# Patient Record
Sex: FEMALE | Race: WHITE | NOT HISPANIC OR LATINO | Employment: FULL TIME | URBAN - METROPOLITAN AREA
[De-identification: names, ages, dates, MRNs, and addresses within clinical notes are randomized per-mention and may not be internally consistent; named-entity substitution may affect disease eponyms.]

---

## 2018-08-20 ENCOUNTER — INITIAL PRENATAL (OUTPATIENT)
Dept: OBSTETRICS AND GYNECOLOGY | Facility: CLINIC | Age: 33
End: 2018-08-20
Payer: COMMERCIAL

## 2018-08-20 VITALS
BODY MASS INDEX: 32.54 KG/M2 | HEIGHT: 63 IN | WEIGHT: 183.63 LBS | SYSTOLIC BLOOD PRESSURE: 100 MMHG | DIASTOLIC BLOOD PRESSURE: 60 MMHG

## 2018-08-20 DIAGNOSIS — O09.512 ADVANCED MATERNAL AGE, PRIMIGRAVIDA IN SECOND TRIMESTER, ANTEPARTUM: ICD-10-CM

## 2018-08-20 PROCEDURE — 99999 PR PBB SHADOW E&M-NEW PATIENT-LVL II: CPT | Mod: PBBFAC,,, | Performed by: ADVANCED PRACTICE MIDWIFE

## 2018-08-20 PROCEDURE — 0500F INITIAL PRENATAL CARE VISIT: CPT | Mod: S$GLB,,, | Performed by: ADVANCED PRACTICE MIDWIFE

## 2018-08-20 NOTE — PROGRESS NOTES
Oriented to our practice, CNGATITO/MD collaborative care. Transfer of care, aayush NGUYEN was on board with her plan and birth desires. Long discussion about our practice philosophy. Pt just wants to let things happen naturally, if epid rec open to all suggestions. Strongly rec childbirth class. Her and spouse have not prevented for 10 yrs and got pregnant. Panorama screen done prenatally and normal.  On PNV. Blue bag info reviewed. zika and dietary recommendations made. coffective vic recommended. Coffective counseling sheet [employer] In Love discussed with mother. Reinforced immediate skin to skin, the magic first hour, importance of the first feeding and delaying routine procedures. Encouraged mother to download Coffective mobile vic if she has not already done so. Mother verbalizes understanding. al

## 2018-09-17 ENCOUNTER — ROUTINE PRENATAL (OUTPATIENT)
Dept: OBSTETRICS AND GYNECOLOGY | Facility: CLINIC | Age: 33
End: 2018-09-17
Payer: COMMERCIAL

## 2018-09-17 VITALS
SYSTOLIC BLOOD PRESSURE: 110 MMHG | WEIGHT: 191.63 LBS | DIASTOLIC BLOOD PRESSURE: 74 MMHG | BODY MASS INDEX: 33.94 KG/M2

## 2018-09-17 DIAGNOSIS — O09.512 ADVANCED MATERNAL AGE, PRIMIGRAVIDA IN SECOND TRIMESTER, ANTEPARTUM: Primary | ICD-10-CM

## 2018-09-17 PROCEDURE — 99999 PR PBB SHADOW E&M-EST. PATIENT-LVL II: CPT | Mod: PBBFAC,,, | Performed by: ADVANCED PRACTICE MIDWIFE

## 2018-09-17 PROCEDURE — 0502F SUBSEQUENT PRENATAL CARE: CPT | Mod: S$GLB,,, | Performed by: ADVANCED PRACTICE MIDWIFE

## 2018-09-17 NOTE — PROGRESS NOTES
Doing well, labs next OV> reviewed US with pt, MFM f/u normal. PTL s/s reviewed. FM counts. Has childbirth class sched for 10/13  Will tour then, deciding on peds, tdap next OV, flu given at work. al

## 2018-10-15 ENCOUNTER — LAB VISIT (OUTPATIENT)
Dept: LAB | Facility: HOSPITAL | Age: 33
End: 2018-10-15
Attending: ADVANCED PRACTICE MIDWIFE
Payer: COMMERCIAL

## 2018-10-15 ENCOUNTER — ROUTINE PRENATAL (OUTPATIENT)
Dept: OBSTETRICS AND GYNECOLOGY | Facility: CLINIC | Age: 33
End: 2018-10-15
Payer: COMMERCIAL

## 2018-10-15 VITALS — WEIGHT: 192.38 LBS | SYSTOLIC BLOOD PRESSURE: 98 MMHG | BODY MASS INDEX: 34.08 KG/M2 | DIASTOLIC BLOOD PRESSURE: 62 MMHG

## 2018-10-15 DIAGNOSIS — O09.512 ADVANCED MATERNAL AGE, PRIMIGRAVIDA IN SECOND TRIMESTER, ANTEPARTUM: ICD-10-CM

## 2018-10-15 DIAGNOSIS — O09.512 ADVANCED MATERNAL AGE, PRIMIGRAVIDA IN SECOND TRIMESTER, ANTEPARTUM: Primary | ICD-10-CM

## 2018-10-15 LAB
BASOPHILS # BLD AUTO: 0.07 K/UL
BASOPHILS NFR BLD: 0.7 %
DIFFERENTIAL METHOD: ABNORMAL
EOSINOPHIL # BLD AUTO: 0.1 K/UL
EOSINOPHIL NFR BLD: 0.9 %
ERYTHROCYTE [DISTWIDTH] IN BLOOD BY AUTOMATED COUNT: 13.4 %
GLUCOSE SERPL-MCNC: 88 MG/DL
HCT VFR BLD AUTO: 34.1 %
HGB BLD-MCNC: 10.8 G/DL
IMM GRANULOCYTES # BLD AUTO: 0.08 K/UL
IMM GRANULOCYTES NFR BLD AUTO: 0.8 %
LYMPHOCYTES # BLD AUTO: 1.4 K/UL
LYMPHOCYTES NFR BLD: 13.4 %
MCH RBC QN AUTO: 31.2 PG
MCHC RBC AUTO-ENTMCNC: 31.7 G/DL
MCV RBC AUTO: 99 FL
MONOCYTES # BLD AUTO: 0.6 K/UL
MONOCYTES NFR BLD: 5.6 %
NEUTROPHILS # BLD AUTO: 8.1 K/UL
NEUTROPHILS NFR BLD: 78.6 %
NRBC BLD-RTO: 0 /100 WBC
PLATELET # BLD AUTO: 317 K/UL
PMV BLD AUTO: 9.9 FL
RBC # BLD AUTO: 3.46 M/UL
WBC # BLD AUTO: 10.27 K/UL

## 2018-10-15 PROCEDURE — 85025 COMPLETE CBC W/AUTO DIFF WBC: CPT

## 2018-10-15 PROCEDURE — 0502F SUBSEQUENT PRENATAL CARE: CPT | Mod: S$GLB,,, | Performed by: ADVANCED PRACTICE MIDWIFE

## 2018-10-15 PROCEDURE — 82950 GLUCOSE TEST: CPT

## 2018-10-15 PROCEDURE — 36415 COLL VENOUS BLD VENIPUNCTURE: CPT | Mod: PO

## 2018-10-15 PROCEDURE — 99999 PR PBB SHADOW E&M-EST. PATIENT-LVL II: CPT | Mod: PBBFAC,,, | Performed by: ADVANCED PRACTICE MIDWIFE

## 2018-10-15 PROCEDURE — 86592 SYPHILIS TEST NON-TREP QUAL: CPT

## 2018-10-15 PROCEDURE — 86703 HIV-1/HIV-2 1 RESULT ANTBDY: CPT

## 2018-10-15 RX ORDER — VALACYCLOVIR HYDROCHLORIDE 1 G/1
TABLET, FILM COATED ORAL
Refills: 3 | COMMUNITY
Start: 2018-10-03

## 2018-10-15 NOTE — PROGRESS NOTES
Doing labs today, attended childbirth class last weekend. States  felt it was informative. Denies PTL s/s some BH. rec maternity belt, stands prolonged periods at work, wears lead apron most of the time. Will get tdap at another location later in pregnancy. al

## 2018-10-16 ENCOUNTER — TELEPHONE (OUTPATIENT)
Dept: OBSTETRICS AND GYNECOLOGY | Facility: CLINIC | Age: 33
End: 2018-10-16

## 2018-10-16 DIAGNOSIS — O99.019 ANEMIA OF MOTHER IN PREGNANCY, ANTEPARTUM: ICD-10-CM

## 2018-10-16 LAB
HIV 1+2 AB+HIV1 P24 AG SERPL QL IA: NEGATIVE
RPR SER QL: NORMAL

## 2018-10-16 RX ORDER — FERROUS SULFATE 325(65) MG
325 TABLET ORAL DAILY
Qty: 30 TABLET | Refills: 3 | Status: SHIPPED | OUTPATIENT
Start: 2018-10-16 | End: 2018-12-03

## 2018-10-16 NOTE — TELEPHONE ENCOUNTER
Please call pt and inform her that she is anemic. I sent an iron prescription to her pharmacy to begin taking. Oneyda

## 2018-10-16 NOTE — TELEPHONE ENCOUNTER
Spoke to patient. Patient notified of results and iron rx sent to pharmacy. Patient verbalized understanding.

## 2018-10-29 ENCOUNTER — ROUTINE PRENATAL (OUTPATIENT)
Dept: OBSTETRICS AND GYNECOLOGY | Facility: CLINIC | Age: 33
End: 2018-10-29
Payer: COMMERCIAL

## 2018-10-29 VITALS
SYSTOLIC BLOOD PRESSURE: 122 MMHG | WEIGHT: 197.38 LBS | DIASTOLIC BLOOD PRESSURE: 76 MMHG | BODY MASS INDEX: 34.97 KG/M2

## 2018-10-29 DIAGNOSIS — O09.512 ADVANCED MATERNAL AGE, PRIMIGRAVIDA IN SECOND TRIMESTER, ANTEPARTUM: Primary | ICD-10-CM

## 2018-10-29 DIAGNOSIS — O99.019 ANEMIA OF MOTHER IN PREGNANCY, ANTEPARTUM: ICD-10-CM

## 2018-10-29 PROCEDURE — 99999 PR PBB SHADOW E&M-EST. PATIENT-LVL II: CPT | Mod: PBBFAC,,, | Performed by: ADVANCED PRACTICE MIDWIFE

## 2018-10-29 PROCEDURE — 0502F SUBSEQUENT PRENATAL CARE: CPT | Mod: S$GLB,,, | Performed by: ADVANCED PRACTICE MIDWIFE

## 2018-10-29 RX ORDER — DOCUSATE SODIUM 100 MG/1
100 CAPSULE, LIQUID FILLED ORAL 2 TIMES DAILY
COMMUNITY
End: 2018-12-03

## 2018-10-29 NOTE — PROGRESS NOTES
"Good FM, some BH, PTL s/s reviewed. Discussed labs, on iron supplement, taking colace for constipation, will increase to BID since still feeling "full" and constipated. Discussed upcoming appt timing, will do growth scan 35-36 wks sec to [facility]    "

## 2018-11-12 ENCOUNTER — ROUTINE PRENATAL (OUTPATIENT)
Dept: OBSTETRICS AND GYNECOLOGY | Facility: CLINIC | Age: 33
End: 2018-11-12
Payer: COMMERCIAL

## 2018-11-12 VITALS
DIASTOLIC BLOOD PRESSURE: 60 MMHG | SYSTOLIC BLOOD PRESSURE: 110 MMHG | WEIGHT: 196.19 LBS | BODY MASS INDEX: 34.76 KG/M2

## 2018-11-12 DIAGNOSIS — O09.512 ADVANCED MATERNAL AGE, PRIMIGRAVIDA IN SECOND TRIMESTER, ANTEPARTUM: Primary | ICD-10-CM

## 2018-11-12 DIAGNOSIS — O99.019 ANEMIA OF MOTHER IN PREGNANCY, ANTEPARTUM: ICD-10-CM

## 2018-11-12 DIAGNOSIS — O26.843 UTERINE SIZE DATE DISCREPANCY PREGNANCY, THIRD TRIMESTER: ICD-10-CM

## 2018-11-12 PROCEDURE — 0502F SUBSEQUENT PRENATAL CARE: CPT | Mod: S$GLB,,, | Performed by: ADVANCED PRACTICE MIDWIFE

## 2018-11-12 PROCEDURE — 99999 PR PBB SHADOW E&M-EST. PATIENT-LVL II: CPT | Mod: PBBFAC,,, | Performed by: ADVANCED PRACTICE MIDWIFE

## 2018-11-13 NOTE — PROGRESS NOTES
Doing well, good FM, no PTL s/s. Syncopal episode X 1 last week while sitting at a desk at work, BP was normal, glucose was normal at 93. Will let me know if any other episodes. Traveled this past weekend went well. Exp US next OV in [place] for growth, GBS next OV. al

## 2018-12-03 ENCOUNTER — ROUTINE PRENATAL (OUTPATIENT)
Dept: OBSTETRICS AND GYNECOLOGY | Facility: CLINIC | Age: 33
End: 2018-12-03
Payer: COMMERCIAL

## 2018-12-03 ENCOUNTER — PROCEDURE VISIT (OUTPATIENT)
Dept: OBSTETRICS AND GYNECOLOGY | Facility: CLINIC | Age: 33
End: 2018-12-03
Payer: COMMERCIAL

## 2018-12-03 ENCOUNTER — LAB VISIT (OUTPATIENT)
Dept: LAB | Facility: HOSPITAL | Age: 33
End: 2018-12-03
Payer: COMMERCIAL

## 2018-12-03 VITALS
DIASTOLIC BLOOD PRESSURE: 76 MMHG | BODY MASS INDEX: 35.73 KG/M2 | WEIGHT: 201.75 LBS | SYSTOLIC BLOOD PRESSURE: 114 MMHG

## 2018-12-03 DIAGNOSIS — O99.019 ANEMIA OF MOTHER IN PREGNANCY, ANTEPARTUM: ICD-10-CM

## 2018-12-03 DIAGNOSIS — O26.843 UTERINE SIZE DATE DISCREPANCY PREGNANCY, THIRD TRIMESTER: ICD-10-CM

## 2018-12-03 DIAGNOSIS — O09.512 ADVANCED MATERNAL AGE, PRIMIGRAVIDA IN SECOND TRIMESTER, ANTEPARTUM: Primary | ICD-10-CM

## 2018-12-03 LAB
HCT VFR BLD AUTO: 34.2 %
HGB BLD-MCNC: 11.1 G/DL

## 2018-12-03 PROCEDURE — 85014 HEMATOCRIT: CPT

## 2018-12-03 PROCEDURE — 0502F SUBSEQUENT PRENATAL CARE: CPT | Mod: S$GLB,,, | Performed by: ADVANCED PRACTICE MIDWIFE

## 2018-12-03 PROCEDURE — 85018 HEMOGLOBIN: CPT

## 2018-12-03 PROCEDURE — 99999 PR PBB SHADOW E&M-EST. PATIENT-LVL II: CPT | Mod: PBBFAC,,, | Performed by: ADVANCED PRACTICE MIDWIFE

## 2018-12-03 PROCEDURE — 76816 OB US FOLLOW-UP PER FETUS: CPT | Mod: S$GLB,,, | Performed by: OBSTETRICS & GYNECOLOGY

## 2018-12-03 PROCEDURE — 90471 IMMUNIZATION ADMIN: CPT | Mod: S$GLB,,, | Performed by: OBSTETRICS & GYNECOLOGY

## 2018-12-03 PROCEDURE — 36415 COLL VENOUS BLD VENIPUNCTURE: CPT

## 2018-12-03 PROCEDURE — 90715 TDAP VACCINE 7 YRS/> IM: CPT | Mod: S$GLB,,, | Performed by: OBSTETRICS & GYNECOLOGY

## 2018-12-03 PROCEDURE — 76819 FETAL BIOPHYS PROFIL W/O NST: CPT | Mod: S$GLB,,, | Performed by: OBSTETRICS & GYNECOLOGY

## 2018-12-03 NOTE — PROGRESS NOTES
Tdap given IM to left deltoid.  Pt tolerated well.  Pt advised to wait 10-15 minutes for s/s of medication reaction, pt to wait while in lab for blood work.  Appt made for next visit at [facility], per pt request.  Pt voiced understanding.  CHRIS PHELAN

## 2018-12-04 NOTE — PROGRESS NOTES
US today EFW 31% JEN MVP 5.4 cm/vtx, BPP 8/8. Doing well, no c/o PTL s/s. Good FM. tdap today. Not taking iron was causing constipation but borderline, will recheck today. GBS next OV. al

## 2018-12-10 ENCOUNTER — ROUTINE PRENATAL (OUTPATIENT)
Dept: OBSTETRICS AND GYNECOLOGY | Facility: CLINIC | Age: 33
End: 2018-12-10
Payer: COMMERCIAL

## 2018-12-10 VITALS
BODY MASS INDEX: 35.85 KG/M2 | DIASTOLIC BLOOD PRESSURE: 68 MMHG | SYSTOLIC BLOOD PRESSURE: 118 MMHG | WEIGHT: 202.38 LBS

## 2018-12-10 DIAGNOSIS — O09.512 ADVANCED MATERNAL AGE, PRIMIGRAVIDA IN SECOND TRIMESTER, ANTEPARTUM: Primary | ICD-10-CM

## 2018-12-10 PROCEDURE — 99999 PR PBB SHADOW E&M-EST. PATIENT-LVL III: CPT | Mod: PBBFAC,,, | Performed by: ADVANCED PRACTICE MIDWIFE

## 2018-12-10 PROCEDURE — 0502F SUBSEQUENT PRENATAL CARE: CPT | Mod: S$GLB,,, | Performed by: ADVANCED PRACTICE MIDWIFE

## 2018-12-10 PROCEDURE — 87081 CULTURE SCREEN ONLY: CPT

## 2018-12-12 LAB — BACTERIA SPEC AEROBE CULT: NORMAL

## 2018-12-17 ENCOUNTER — ROUTINE PRENATAL (OUTPATIENT)
Dept: OBSTETRICS AND GYNECOLOGY | Facility: CLINIC | Age: 33
End: 2018-12-17
Payer: COMMERCIAL

## 2018-12-17 VITALS
BODY MASS INDEX: 36.01 KG/M2 | WEIGHT: 203.25 LBS | SYSTOLIC BLOOD PRESSURE: 116 MMHG | DIASTOLIC BLOOD PRESSURE: 70 MMHG

## 2018-12-17 DIAGNOSIS — O99.019 ANEMIA OF MOTHER IN PREGNANCY, ANTEPARTUM: ICD-10-CM

## 2018-12-17 DIAGNOSIS — O09.512 ADVANCED MATERNAL AGE, PRIMIGRAVIDA IN SECOND TRIMESTER, ANTEPARTUM: Primary | ICD-10-CM

## 2018-12-17 PROCEDURE — 99999 PR PBB SHADOW E&M-EST. PATIENT-LVL II: CPT | Mod: PBBFAC,,, | Performed by: ADVANCED PRACTICE MIDWIFE

## 2018-12-17 PROCEDURE — 0502F SUBSEQUENT PRENATAL CARE: CPT | Mod: S$GLB,,, | Performed by: ADVANCED PRACTICE MIDWIFE

## 2018-12-24 ENCOUNTER — ROUTINE PRENATAL (OUTPATIENT)
Dept: OBSTETRICS AND GYNECOLOGY | Facility: CLINIC | Age: 33
End: 2018-12-24
Payer: COMMERCIAL

## 2018-12-24 VITALS — BODY MASS INDEX: 36.32 KG/M2 | DIASTOLIC BLOOD PRESSURE: 68 MMHG | SYSTOLIC BLOOD PRESSURE: 100 MMHG | WEIGHT: 205 LBS

## 2018-12-24 DIAGNOSIS — O09.512 ADVANCED MATERNAL AGE, PRIMIGRAVIDA IN SECOND TRIMESTER, ANTEPARTUM: Primary | ICD-10-CM

## 2018-12-24 DIAGNOSIS — O99.019 ANEMIA OF MOTHER IN PREGNANCY, ANTEPARTUM: ICD-10-CM

## 2018-12-24 PROCEDURE — 0502F SUBSEQUENT PRENATAL CARE: CPT | Mod: S$GLB,,, | Performed by: ADVANCED PRACTICE MIDWIFE

## 2018-12-24 PROCEDURE — 99999 PR PBB SHADOW E&M-EST. PATIENT-LVL II: CPT | Mod: PBBFAC,,, | Performed by: ADVANCED PRACTICE MIDWIFE

## 2018-12-24 NOTE — PROGRESS NOTES
Some UC last week after long day at work. Assisting in shorter cases for now. rec staying active! Position changes baby feels OP on pubic bone. Ready for baby. al

## 2019-01-02 ENCOUNTER — ROUTINE PRENATAL (OUTPATIENT)
Dept: OBSTETRICS AND GYNECOLOGY | Facility: CLINIC | Age: 34
End: 2019-01-02
Payer: COMMERCIAL

## 2019-01-02 VITALS — SYSTOLIC BLOOD PRESSURE: 116 MMHG | WEIGHT: 205 LBS | BODY MASS INDEX: 36.32 KG/M2 | DIASTOLIC BLOOD PRESSURE: 78 MMHG

## 2019-01-02 DIAGNOSIS — O09.512 ADVANCED MATERNAL AGE, PRIMIGRAVIDA IN SECOND TRIMESTER, ANTEPARTUM: Primary | ICD-10-CM

## 2019-01-02 DIAGNOSIS — O99.019 ANEMIA OF MOTHER IN PREGNANCY, ANTEPARTUM: ICD-10-CM

## 2019-01-02 PROCEDURE — 99999 PR PBB SHADOW E&M-EST. PATIENT-LVL II: CPT | Mod: PBBFAC,,, | Performed by: ADVANCED PRACTICE MIDWIFE

## 2019-01-02 PROCEDURE — 0502F SUBSEQUENT PRENATAL CARE: CPT | Mod: S$GLB,,, | Performed by: ADVANCED PRACTICE MIDWIFE

## 2019-01-02 PROCEDURE — 99999 PR PBB SHADOW E&M-EST. PATIENT-LVL II: ICD-10-PCS | Mod: PBBFAC,,, | Performed by: ADVANCED PRACTICE MIDWIFE

## 2019-01-02 PROCEDURE — 0502F PR SUBSEQUENT PRENATAL CARE: ICD-10-PCS | Mod: S$GLB,,, | Performed by: ADVANCED PRACTICE MIDWIFE

## 2019-01-02 NOTE — PROGRESS NOTES
No RUC, tired, ready for baby. Still working and staying active. Discussed IOL timing, risks, pt fearful of pitocin exp can be safely used. Good FM. al

## 2019-01-07 ENCOUNTER — ROUTINE PRENATAL (OUTPATIENT)
Dept: OBSTETRICS AND GYNECOLOGY | Facility: CLINIC | Age: 34
End: 2019-01-07
Payer: COMMERCIAL

## 2019-01-07 VITALS — SYSTOLIC BLOOD PRESSURE: 118 MMHG | WEIGHT: 205 LBS | DIASTOLIC BLOOD PRESSURE: 74 MMHG | BODY MASS INDEX: 36.31 KG/M2

## 2019-01-07 DIAGNOSIS — O99.019 ANEMIA OF MOTHER IN PREGNANCY, ANTEPARTUM: ICD-10-CM

## 2019-01-07 DIAGNOSIS — O48.0 POST TERM PREGNANCY OVER 40 WEEKS: ICD-10-CM

## 2019-01-07 DIAGNOSIS — O09.512 ADVANCED MATERNAL AGE, PRIMIGRAVIDA IN SECOND TRIMESTER, ANTEPARTUM: Primary | ICD-10-CM

## 2019-01-07 PROCEDURE — 0502F PR SUBSEQUENT PRENATAL CARE: ICD-10-PCS | Mod: S$GLB,,, | Performed by: ADVANCED PRACTICE MIDWIFE

## 2019-01-07 PROCEDURE — 99999 PR PBB SHADOW E&M-EST. PATIENT-LVL II: CPT | Mod: PBBFAC,,, | Performed by: ADVANCED PRACTICE MIDWIFE

## 2019-01-07 PROCEDURE — 0502F SUBSEQUENT PRENATAL CARE: CPT | Mod: S$GLB,,, | Performed by: ADVANCED PRACTICE MIDWIFE

## 2019-01-07 PROCEDURE — 99999 PR PBB SHADOW E&M-EST. PATIENT-LVL II: ICD-10-PCS | Mod: PBBFAC,,, | Performed by: ADVANCED PRACTICE MIDWIFE

## 2019-01-07 NOTE — PROGRESS NOTES
Doing well, ready for baby, denies RUC. cx soft/favorable, + spotting with exam, reassured. Will see pt in office in 3-4 days, BPP for post term. IOL scheduled for 1/14 @ 0001, exp cytotec use. al

## 2019-01-08 ENCOUNTER — TELEPHONE (OUTPATIENT)
Dept: OBSTETRICS AND GYNECOLOGY | Facility: HOSPITAL | Age: 34
End: 2019-01-08

## 2019-01-11 ENCOUNTER — HOSPITAL ENCOUNTER (INPATIENT)
Facility: HOSPITAL | Age: 34
LOS: 4 days | Discharge: HOME | End: 2019-01-15
Attending: OBSTETRICS & GYNECOLOGY | Admitting: OBSTETRICS & GYNECOLOGY
Payer: COMMERCIAL

## 2019-01-11 ENCOUNTER — ROUTINE PRENATAL (OUTPATIENT)
Dept: OBSTETRICS AND GYNECOLOGY | Facility: CLINIC | Age: 34
End: 2019-01-11
Payer: COMMERCIAL

## 2019-01-11 ENCOUNTER — PROCEDURE VISIT (OUTPATIENT)
Dept: OBSTETRICS AND GYNECOLOGY | Facility: CLINIC | Age: 34
End: 2019-01-11
Payer: COMMERCIAL

## 2019-01-11 DIAGNOSIS — O48.0 POST TERM PREGNANCY OVER 40 WEEKS: Primary | ICD-10-CM

## 2019-01-11 DIAGNOSIS — O09.512 ADVANCED MATERNAL AGE, PRIMIGRAVIDA IN SECOND TRIMESTER, ANTEPARTUM: ICD-10-CM

## 2019-01-11 DIAGNOSIS — Z98.891 S/P PRIMARY LOW TRANSVERSE C-SECTION: Primary | ICD-10-CM

## 2019-01-11 DIAGNOSIS — O28.3 ABNORMAL PREGNANCY US: ICD-10-CM

## 2019-01-11 DIAGNOSIS — O48.0 POST TERM PREGNANCY OVER 40 WEEKS: ICD-10-CM

## 2019-01-11 LAB
ABO + RH BLD: NORMAL
ANISOCYTOSIS BLD QL SMEAR: SLIGHT
BASOPHILS # BLD AUTO: 0.03 K/UL
BASOPHILS NFR BLD: 0.2 %
BLD GP AB SCN CELLS X3 SERPL QL: NORMAL
DACRYOCYTES BLD QL SMEAR: ABNORMAL
DIFFERENTIAL METHOD: ABNORMAL
EOSINOPHIL # BLD AUTO: 0.1 K/UL
EOSINOPHIL NFR BLD: 0.7 %
ERYTHROCYTE [DISTWIDTH] IN BLOOD BY AUTOMATED COUNT: 13.2 %
HCT VFR BLD AUTO: 36.3 %
HGB BLD-MCNC: 12.1 G/DL
HYPOCHROMIA BLD QL SMEAR: ABNORMAL
LYMPHOCYTES # BLD AUTO: 1.9 K/UL
LYMPHOCYTES NFR BLD: 15.5 %
MCH RBC QN AUTO: 31.1 PG
MCHC RBC AUTO-ENTMCNC: 33.3 G/DL
MCV RBC AUTO: 93 FL
MONOCYTES # BLD AUTO: 0.7 K/UL
MONOCYTES NFR BLD: 5.9 %
NEUTROPHILS # BLD AUTO: 9.4 K/UL
NEUTROPHILS NFR BLD: 77.7 %
OVALOCYTES BLD QL SMEAR: ABNORMAL
PLATELET # BLD AUTO: 292 K/UL
PLATELET BLD QL SMEAR: ABNORMAL
PMV BLD AUTO: 9.8 FL
POIKILOCYTOSIS BLD QL SMEAR: SLIGHT
RBC # BLD AUTO: 3.89 M/UL
SPHEROCYTES BLD QL SMEAR: ABNORMAL
STOMATOCYTES BLD QL SMEAR: PRESENT
WBC # BLD AUTO: 12.11 K/UL

## 2019-01-11 PROCEDURE — 25000003 PHARM REV CODE 250: Performed by: ADVANCED PRACTICE MIDWIFE

## 2019-01-11 PROCEDURE — 76819 PR US, OB, FETAL BIOPHYSICAL, W/O NST: ICD-10-PCS | Mod: S$GLB,,, | Performed by: OBSTETRICS & GYNECOLOGY

## 2019-01-11 PROCEDURE — 86870 RBC ANTIBODY IDENTIFICATION: CPT

## 2019-01-11 PROCEDURE — 86880 COOMBS TEST DIRECT: CPT

## 2019-01-11 PROCEDURE — 0502F SUBSEQUENT PRENATAL CARE: CPT | Mod: S$GLB,,, | Performed by: ADVANCED PRACTICE MIDWIFE

## 2019-01-11 PROCEDURE — 72100003 HC LABOR CARE, EA. ADDL. 8 HRS

## 2019-01-11 PROCEDURE — 76819 FETAL BIOPHYS PROFIL W/O NST: CPT | Mod: S$GLB,,, | Performed by: OBSTETRICS & GYNECOLOGY

## 2019-01-11 PROCEDURE — 0502F PR SUBSEQUENT PRENATAL CARE: ICD-10-PCS | Mod: S$GLB,,, | Performed by: ADVANCED PRACTICE MIDWIFE

## 2019-01-11 PROCEDURE — 11000001 HC ACUTE MED/SURG PRIVATE ROOM

## 2019-01-11 PROCEDURE — 86901 BLOOD TYPING SEROLOGIC RH(D): CPT

## 2019-01-11 PROCEDURE — 86077 PHYS BLOOD BANK SERV XMATCH: CPT | Mod: ,,, | Performed by: PATHOLOGY

## 2019-01-11 PROCEDURE — 72100002 HC LABOR CARE, 1ST 8 HOURS

## 2019-01-11 PROCEDURE — 86077 PR  PHYS BLD BANK SERV,DIFF XMTCH: ICD-10-PCS | Mod: ,,, | Performed by: PATHOLOGY

## 2019-01-11 PROCEDURE — 85025 COMPLETE CBC W/AUTO DIFF WBC: CPT

## 2019-01-11 RX ORDER — SODIUM CHLORIDE 9 MG/ML
INJECTION, SOLUTION INTRAVENOUS
Status: DISCONTINUED | OUTPATIENT
Start: 2019-01-11 | End: 2019-01-14

## 2019-01-11 RX ORDER — OXYTOCIN/RINGER'S LACTATE 20/1000 ML
333 PLASTIC BAG, INJECTION (ML) INTRAVENOUS CONTINUOUS
Status: ACTIVE | OUTPATIENT
Start: 2019-01-11 | End: 2019-01-11

## 2019-01-11 RX ORDER — SODIUM CHLORIDE, SODIUM LACTATE, POTASSIUM CHLORIDE, CALCIUM CHLORIDE 600; 310; 30; 20 MG/100ML; MG/100ML; MG/100ML; MG/100ML
INJECTION, SOLUTION INTRAVENOUS CONTINUOUS
Status: DISCONTINUED | OUTPATIENT
Start: 2019-01-11 | End: 2019-01-14

## 2019-01-11 RX ORDER — OXYTOCIN/RINGER'S LACTATE 20/1000 ML
41.7 PLASTIC BAG, INJECTION (ML) INTRAVENOUS CONTINUOUS
Status: ACTIVE | OUTPATIENT
Start: 2019-01-11 | End: 2019-01-11

## 2019-01-11 RX ORDER — ONDANSETRON 8 MG/1
8 TABLET, ORALLY DISINTEGRATING ORAL EVERY 8 HOURS PRN
Status: DISCONTINUED | OUTPATIENT
Start: 2019-01-11 | End: 2019-01-14

## 2019-01-11 RX ADMIN — DINOPROSTONE 10 MG: 10 INSERT VAGINAL at 11:01

## 2019-01-11 RX ADMIN — DINOPROSTONE 10 MG: 10 INSERT VAGINAL at 07:01

## 2019-01-11 NOTE — NURSING
"Discussed feeding choice with mother.  Reviewed benefits of breastfeeding and risks of formula feeding. Patient given "What to Expect in the First 48 Hours" handout. Mother states her intention is to breast feed infant.   "

## 2019-01-12 LAB
BLOOD GROUP ANTIBODIES SERPL: NORMAL
DAT IGG-SP REAG RBC-IMP: NORMAL

## 2019-01-12 PROCEDURE — 86901 BLOOD TYPING SEROLOGIC RH(D): CPT

## 2019-01-12 PROCEDURE — 25000003 PHARM REV CODE 250: Performed by: ADVANCED PRACTICE MIDWIFE

## 2019-01-12 PROCEDURE — 72100003 HC LABOR CARE, EA. ADDL. 8 HRS

## 2019-01-12 PROCEDURE — 86850 RBC ANTIBODY SCREEN: CPT

## 2019-01-12 PROCEDURE — 86900 BLOOD TYPING SEROLOGIC ABO: CPT

## 2019-01-12 PROCEDURE — 11000001 HC ACUTE MED/SURG PRIVATE ROOM

## 2019-01-12 RX ORDER — ACETAMINOPHEN 325 MG/1
650 TABLET ORAL EVERY 6 HOURS PRN
Status: DISCONTINUED | OUTPATIENT
Start: 2019-01-12 | End: 2019-01-14

## 2019-01-12 RX ORDER — HYDROXYZINE PAMOATE 50 MG/1
50 CAPSULE ORAL EVERY 8 HOURS PRN
Status: DISCONTINUED | OUTPATIENT
Start: 2019-01-12 | End: 2019-01-14

## 2019-01-12 RX ORDER — MISOPROSTOL 100 MCG
50 TABLET ORAL EVERY 6 HOURS
Status: DISCONTINUED | OUTPATIENT
Start: 2019-01-12 | End: 2019-01-14

## 2019-01-12 RX ORDER — MISOPROSTOL 100 MCG
25 TABLET ORAL
Status: DISCONTINUED | OUTPATIENT
Start: 2019-01-12 | End: 2019-01-12

## 2019-01-12 RX ADMIN — ACETAMINOPHEN 650 MG: 325 TABLET ORAL at 02:01

## 2019-01-12 RX ADMIN — Medication 50 MCG: at 05:01

## 2019-01-12 RX ADMIN — Medication 25 MCG: at 01:01

## 2019-01-12 RX ADMIN — Medication 25 MCG: at 08:01

## 2019-01-12 NOTE — PROGRESS NOTES
S: Doing well, POC discussed with pt and she agrees   O:  VS reviewed, afebrile   Vitals:    01/11/19 1929 01/11/19 2230 01/12/19 0145 01/12/19 0818   BP: 126/78 124/86 (!) 98/59 116/73   Pulse: (!) 111 79 79 110   Resp: 18 16 16    Temp: 97.9 °F (36.6 °C) 97.8 °F (36.6 °C) 98.1 °F (36.7 °C) 98.6 °F (37 °C)   TempSrc: Oral Oral Oral Oral   Weight:       Height:           FHTs: 130, cat 1   UC:ireg   SVE: 1/50/-3  Cervidil dc per RN at 0600  A: IUP @ 40w6d ;     Patient Active Problem List   Diagnosis    Advanced maternal age, primigravida in second trimester, antepartum    Anemia of mother in pregnancy, antepartum    Abnormal pregnancy US       P:Cytotec 25 mcg po q 6 hrs    Continue close monitoring of maternal/fetal status

## 2019-01-12 NOTE — SUBJECTIVE & OBJECTIVE
Obstetric HPI:  Patient reports no contractions, active fetal movement, No vaginal bleeding , No loss of fluid     This pregnancy has been complicated by AMA, pregnancy uneventful, BPP today 4 of 8    Obstetric History       T0      L0     SAB0   TAB0   Ectopic0   Multiple0   Live Births0       # Outcome Date GA Lbr Rohit/2nd Weight Sex Delivery Anes PTL Lv   1 Current               Obstetric Comments   G//Panorama WNL     History reviewed. No pertinent past medical history.  Past Surgical History:   Procedure Laterality Date    GANGLION CYST EXCISION         PTA Medications   Medication Sig    acetaminophen (TYLENOL) 500 MG tablet Take 500 mg by mouth every 6 (six) hours as needed for Pain.    doxylamine succinate (UNISOM, DOXYLAMINE,) 25 mg tablet Take 25 mg by mouth nightly as needed for Insomnia.    evening primrose oil (EVENING PRIMROSE ORAL) Take by mouth.    prenatal vit/iron fum/folic ac (PRENATAL 1+1 ORAL) Take by mouth.    valACYclovir (VALTREX) 1000 MG tablet TK 2 TS PO BID       Review of patient's allergies indicates:  No Known Allergies     Family History     None        Tobacco Use    Smoking status: Never Smoker    Smokeless tobacco: Never Used   Substance and Sexual Activity    Alcohol use: No     Frequency: Never    Drug use: No    Sexual activity: Yes     Partners: Male     Review of Systems   Constitutional: Negative.    HENT: Negative.    Eyes: Negative.    Respiratory: Negative.    Cardiovascular: Negative.    Gastrointestinal: Negative.    Endocrine: Negative.    Genitourinary: Negative.    Musculoskeletal: Negative.    Neurological: Negative.    Hematological: Negative.    Psychiatric/Behavioral: Negative.    All other systems reviewed and are negative.  Breast: negative.       Objective:     Vital Signs (Most Recent):  Temp: 98.3 °F (36.8 °C) (19 1720)  Pulse: (!) 118 (19 1720)  Resp: 18 (19 1430)  BP: 120/77 (19 1720) Vital Signs (24h  Range):  Temp:  [98.2 °F (36.8 °C)-98.3 °F (36.8 °C)] 98.3 °F (36.8 °C)  Pulse:  [] 118  Resp:  [18] 18  BP: (120)/(77-83) 120/77     Weight: 93 kg (205 lb)  Body mass index is 36.31 kg/m².    FHT: Cat 1 (reassuring)  TOCO: Irregular     Physical Exam:   Constitutional: She is oriented to person, place, and time. She appears well-developed and well-nourished.    HENT:   Head: Normocephalic.    Eyes: Pupils are equal, round, and reactive to light.    Neck: Normal range of motion.    Cardiovascular: Normal rate and regular rhythm.     Pulmonary/Chest: Effort normal and breath sounds normal.        Abdominal: Soft.     Genitourinary: Uterus normal.           Musculoskeletal: Normal range of motion and moves all extremeties.       Neurological: She is alert and oriented to person, place, and time. She has normal reflexes.    Skin: Skin is warm and dry.    Psychiatric: She has a normal mood and affect. Her behavior is normal.   CERVix 1/50/-2         Significant Labs:  Lab Results   Component Value Date    GROUPTRH O POS 06/20/2018    STREPBCULT No Group B Streptococcus isolated 12/10/2018       I have personallly reviewed all pertinent lab results from the last 24 hours.

## 2019-01-12 NOTE — HOSPITAL COURSE
Admit Desires to eat will initiate cervidil @ 1900, POC discussed with patient and SO, verbalizes understanding   Sheldon bulb inserted  AROM 1532  IUPC placed  Pushed x 2 hours with poor progress  Will proceed to LTCS  01/15/2019--stable for discharge on pod#2

## 2019-01-12 NOTE — PROGRESS NOTES
"S:states cervidil fell out in toilet  O:  VS reviewed, afebrile   Vitals:    01/11/19 1430 01/11/19 1720 01/11/19 1929   BP: 120/83 120/77 126/78   Pulse: 81 (!) 118 (!) 111   Resp: 18  18   Temp: 98.2 °F (36.8 °C) 98.3 °F (36.8 °C) 97.9 °F (36.6 °C)   TempSrc: Oral Oral Oral   Weight: 93 kg (205 lb)     Height: 5' 3" (1.6 m)         FHTs:  CAT 1-2, reassuring  UC: Irregular   SVE: FT/80    A: IUP @ 40w5d ;     Patient Active Problem List   Diagnosis    Advanced maternal age, primigravida in second trimester, antepartum    Anemia of mother in pregnancy, antepartum    Abnormal pregnancy US       P: Reinsert Cervidil  Continue close monitoring of maternal/fetal status  "

## 2019-01-12 NOTE — PLAN OF CARE
Problem: Adult Inpatient Plan of Care  Goal: Patient-Specific Goal (Individualization)  Outcome: Ongoing (interventions implemented as appropriate)  Pt progressing through IOL with PO cytotec.  SVE 1/50-3 posterior.  Family at  for support.  VS WNL, membranes intact, contractions mild.

## 2019-01-12 NOTE — PROGRESS NOTES
S:Doing well, questioning c/s and length of iol, questions answered and she agrees to POC, cs discouraged, risks reviewed   O:  VS reviewed, afebrile   Vitals:    01/12/19 0145 01/12/19 0818 01/12/19 1224 01/12/19 1720   BP: (!) 98/59 116/73 111/74 116/69   Pulse: 79 110 94 76   Resp: 16      Temp: 98.1 °F (36.7 °C) 98.6 °F (37 °C) 98.2 °F (36.8 °C) 98.5 °F (36.9 °C)   TempSrc: Oral Oral Oral Oral   Weight:       Height:           FHTs: 130, cat 1   UC q 1-5 ireg  SVE 1/50/-3    A: IUP @ 40w6d ;     Patient Active Problem List   Diagnosis    Advanced maternal age, primigravida in second trimester, antepartum    Anemia of mother in pregnancy, antepartum    Abnormal pregnancy US       P:Cytotec 50 mcg po q 6   Continue close monitoring of maternal/fetal status  Anticipate vag del

## 2019-01-12 NOTE — PROGRESS NOTES
Pt states that she has a hard time sleeping and requests minimal disruptions throughout the night and vitals only when awake. Pt stable at this time, educated on risks and benefits. Verbalized understanding.

## 2019-01-13 ENCOUNTER — ANESTHESIA (OUTPATIENT)
Dept: OBSTETRICS AND GYNECOLOGY | Facility: HOSPITAL | Age: 34
End: 2019-01-13
Payer: COMMERCIAL

## 2019-01-13 ENCOUNTER — ANESTHESIA EVENT (OUTPATIENT)
Dept: OBSTETRICS AND GYNECOLOGY | Facility: HOSPITAL | Age: 34
End: 2019-01-13
Payer: COMMERCIAL

## 2019-01-13 VITALS
HEART RATE: 99 BPM | TEMPERATURE: 99 F | SYSTOLIC BLOOD PRESSURE: 111 MMHG | OXYGEN SATURATION: 98 % | DIASTOLIC BLOOD PRESSURE: 58 MMHG

## 2019-01-13 PROBLEM — Z98.891 S/P PRIMARY LOW TRANSVERSE C-SECTION: Status: ACTIVE | Noted: 2019-01-13

## 2019-01-13 PROCEDURE — 25000003 PHARM REV CODE 250: Performed by: ADVANCED PRACTICE MIDWIFE

## 2019-01-13 PROCEDURE — 62326 NJX INTERLAMINAR LMBR/SAC: CPT | Performed by: ANESTHESIOLOGY

## 2019-01-13 PROCEDURE — 51701 INSERT BLADDER CATHETER: CPT

## 2019-01-13 PROCEDURE — 63600175 PHARM REV CODE 636 W HCPCS: Performed by: OBSTETRICS & GYNECOLOGY

## 2019-01-13 PROCEDURE — 51702 INSERT TEMP BLADDER CATH: CPT

## 2019-01-13 PROCEDURE — 37000009 HC ANESTHESIA EA ADD 15 MINS: Performed by: OBSTETRICS & GYNECOLOGY

## 2019-01-13 PROCEDURE — 63600175 PHARM REV CODE 636 W HCPCS: Performed by: ADVANCED PRACTICE MIDWIFE

## 2019-01-13 PROCEDURE — 59514 CESAREAN DELIVERY ONLY: CPT | Mod: AS,GB,, | Performed by: PHYSICIAN ASSISTANT

## 2019-01-13 PROCEDURE — 63600175 PHARM REV CODE 636 W HCPCS: Performed by: NURSE ANESTHETIST, CERTIFIED REGISTERED

## 2019-01-13 PROCEDURE — 25000003 PHARM REV CODE 250: Performed by: ANESTHESIOLOGY

## 2019-01-13 PROCEDURE — 59510 PR FULL ROUT OBSTE CARE,CESAREAN DELIV: ICD-10-PCS | Mod: GB,,, | Performed by: OBSTETRICS & GYNECOLOGY

## 2019-01-13 PROCEDURE — 25000003 PHARM REV CODE 250: Performed by: NURSE ANESTHETIST, CERTIFIED REGISTERED

## 2019-01-13 PROCEDURE — 27800517 HC TRAY,EPIDURAL-CONTINUOUS: Performed by: NURSE ANESTHETIST, CERTIFIED REGISTERED

## 2019-01-13 PROCEDURE — 25000003 PHARM REV CODE 250: Performed by: OBSTETRICS & GYNECOLOGY

## 2019-01-13 PROCEDURE — 59510 CESAREAN DELIVERY: CPT | Mod: GB,,, | Performed by: OBSTETRICS & GYNECOLOGY

## 2019-01-13 PROCEDURE — 59514 PR CESAREAN DELIVERY ONLY: ICD-10-PCS | Mod: AS,GB,, | Performed by: PHYSICIAN ASSISTANT

## 2019-01-13 PROCEDURE — 36000684 HC CESAREAN SECTION, UNSCHEDULED

## 2019-01-13 PROCEDURE — 11000001 HC ACUTE MED/SURG PRIVATE ROOM

## 2019-01-13 PROCEDURE — 72100003 HC LABOR CARE, EA. ADDL. 8 HRS

## 2019-01-13 PROCEDURE — 37000008 HC ANESTHESIA 1ST 15 MINUTES: Performed by: OBSTETRICS & GYNECOLOGY

## 2019-01-13 RX ORDER — SODIUM CITRATE AND CITRIC ACID MONOHYDRATE 334; 500 MG/5ML; MG/5ML
30 SOLUTION ORAL
Status: DISCONTINUED | OUTPATIENT
Start: 2019-01-13 | End: 2019-01-14

## 2019-01-13 RX ORDER — KETOROLAC TROMETHAMINE 30 MG/ML
INJECTION, SOLUTION INTRAMUSCULAR; INTRAVENOUS
Status: DISCONTINUED | OUTPATIENT
Start: 2019-01-13 | End: 2019-01-13

## 2019-01-13 RX ORDER — MISOPROSTOL 200 UG/1
800 TABLET ORAL
Status: DISCONTINUED | OUTPATIENT
Start: 2019-01-13 | End: 2019-01-14

## 2019-01-13 RX ORDER — OXYTOCIN/RINGER'S LACTATE 20/1000 ML
333 PLASTIC BAG, INJECTION (ML) INTRAVENOUS CONTINUOUS
Status: ACTIVE | OUTPATIENT
Start: 2019-01-13 | End: 2019-01-13

## 2019-01-13 RX ORDER — LIDOCAINE HCL/EPINEPHRINE/PF 2%-1:200K
VIAL (ML) INJECTION
Status: DISCONTINUED | OUTPATIENT
Start: 2019-01-13 | End: 2019-01-13

## 2019-01-13 RX ORDER — BUTORPHANOL TARTRATE 2 MG/ML
1 INJECTION INTRAMUSCULAR; INTRAVENOUS ONCE
Status: COMPLETED | OUTPATIENT
Start: 2019-01-13 | End: 2019-01-13

## 2019-01-13 RX ORDER — OXYTOCIN/RINGER'S LACTATE 20/1000 ML
41.7 PLASTIC BAG, INJECTION (ML) INTRAVENOUS CONTINUOUS
Status: DISCONTINUED | OUTPATIENT
Start: 2019-01-13 | End: 2019-01-14

## 2019-01-13 RX ORDER — ROPIVACAINE HYDROCHLORIDE 2 MG/ML
INJECTION, SOLUTION EPIDURAL; INFILTRATION; PERINEURAL CONTINUOUS PRN
Status: DISCONTINUED | OUTPATIENT
Start: 2019-01-13 | End: 2019-01-13

## 2019-01-13 RX ORDER — SODIUM CITRATE AND CITRIC ACID MONOHYDRATE 334; 500 MG/5ML; MG/5ML
30 SOLUTION ORAL
Status: DISCONTINUED | OUTPATIENT
Start: 2019-01-13 | End: 2019-01-13 | Stop reason: SDUPTHER

## 2019-01-13 RX ORDER — LIDOCAINE HYDROCHLORIDE AND EPINEPHRINE 15; 5 MG/ML; UG/ML
INJECTION, SOLUTION EPIDURAL
Status: COMPLETED | OUTPATIENT
Start: 2019-01-13 | End: 2019-01-13

## 2019-01-13 RX ORDER — ONDANSETRON 2 MG/ML
INJECTION INTRAMUSCULAR; INTRAVENOUS
Status: DISCONTINUED | OUTPATIENT
Start: 2019-01-13 | End: 2019-01-13

## 2019-01-13 RX ORDER — OXYTOCIN/RINGER'S LACTATE 20/1000 ML
2 PLASTIC BAG, INJECTION (ML) INTRAVENOUS CONTINUOUS
Status: DISCONTINUED | OUTPATIENT
Start: 2019-01-13 | End: 2019-01-14

## 2019-01-13 RX ORDER — MISOPROSTOL 200 UG/1
800 TABLET ORAL
Status: DISCONTINUED | OUTPATIENT
Start: 2019-01-13 | End: 2019-01-13 | Stop reason: SDUPTHER

## 2019-01-13 RX ORDER — SODIUM CHLORIDE, SODIUM LACTATE, POTASSIUM CHLORIDE, CALCIUM CHLORIDE 600; 310; 30; 20 MG/100ML; MG/100ML; MG/100ML; MG/100ML
INJECTION, SOLUTION INTRAVENOUS CONTINUOUS
Status: DISCONTINUED | OUTPATIENT
Start: 2019-01-13 | End: 2019-01-14

## 2019-01-13 RX ORDER — OXYTOCIN/RINGER'S LACTATE 20/1000 ML
333 PLASTIC BAG, INJECTION (ML) INTRAVENOUS CONTINUOUS
Status: DISCONTINUED | OUTPATIENT
Start: 2019-01-13 | End: 2019-01-13 | Stop reason: SDUPTHER

## 2019-01-13 RX ORDER — MORPHINE SULFATE 1 MG/ML
INJECTION, SOLUTION EPIDURAL; INTRATHECAL; INTRAVENOUS
Status: DISCONTINUED | OUTPATIENT
Start: 2019-01-13 | End: 2019-01-13

## 2019-01-13 RX ORDER — SODIUM CHLORIDE, SODIUM LACTATE, POTASSIUM CHLORIDE, CALCIUM CHLORIDE 600; 310; 30; 20 MG/100ML; MG/100ML; MG/100ML; MG/100ML
INJECTION, SOLUTION INTRAVENOUS CONTINUOUS
Status: DISCONTINUED | OUTPATIENT
Start: 2019-01-13 | End: 2019-01-13 | Stop reason: SDUPTHER

## 2019-01-13 RX ORDER — ROPIVACAINE HYDROCHLORIDE 2 MG/ML
INJECTION, SOLUTION EPIDURAL; INFILTRATION; PERINEURAL
Status: DISCONTINUED | OUTPATIENT
Start: 2019-01-13 | End: 2019-01-13

## 2019-01-13 RX ADMIN — ROPIVACAINE HYDROCHLORIDE 5 ML: 2 INJECTION, SOLUTION EPIDURAL; INFILTRATION at 06:01

## 2019-01-13 RX ADMIN — SODIUM CHLORIDE, SODIUM LACTATE, POTASSIUM CHLORIDE, AND CALCIUM CHLORIDE: 600; 310; 30; 20 INJECTION, SOLUTION INTRAVENOUS at 07:01

## 2019-01-13 RX ADMIN — Medication 2 MILLI-UNITS/MIN: at 07:01

## 2019-01-13 RX ADMIN — KETOROLAC TROMETHAMINE 30 MG: 30 INJECTION, SOLUTION INTRAMUSCULAR at 10:01

## 2019-01-13 RX ADMIN — LIDOCAINE HYDROCHLORIDE,EPINEPHRINE BITARTRATE 5 ML: 20; .005 INJECTION, SOLUTION EPIDURAL; INFILTRATION; INTRACAUDAL; PERINEURAL at 09:01

## 2019-01-13 RX ADMIN — Medication 50 MCG: at 01:01

## 2019-01-13 RX ADMIN — MEPERIDINE HYDROCHLORIDE 12.5 MG: 50 INJECTION, SOLUTION INTRAMUSCULAR; INTRAVENOUS; SUBCUTANEOUS at 10:01

## 2019-01-13 RX ADMIN — Medication 20 UNITS: at 10:01

## 2019-01-13 RX ADMIN — BUTORPHANOL TARTRATE 1 MG: 2 INJECTION, SOLUTION INTRAMUSCULAR; INTRAVENOUS at 07:01

## 2019-01-13 RX ADMIN — SODIUM CHLORIDE, SODIUM LACTATE, POTASSIUM CHLORIDE, AND CALCIUM CHLORIDE: 600; 310; 30; 20 INJECTION, SOLUTION INTRAVENOUS at 12:01

## 2019-01-13 RX ADMIN — ROPIVACAINE HYDROCHLORIDE 12 ML/HR: 2 INJECTION, SOLUTION EPIDURAL; INFILTRATION at 12:01

## 2019-01-13 RX ADMIN — LIDOCAINE HYDROCHLORIDE,EPINEPHRINE BITARTRATE 4 ML: 20; .005 INJECTION, SOLUTION EPIDURAL; INFILTRATION; INTRACAUDAL; PERINEURAL at 08:01

## 2019-01-13 RX ADMIN — ONDANSETRON 4 MG: 2 INJECTION, SOLUTION INTRAMUSCULAR; INTRAVENOUS at 10:01

## 2019-01-13 RX ADMIN — ROPIVACAINE HYDROCHLORIDE 14 ML/HR: 2 INJECTION, SOLUTION EPIDURAL; INFILTRATION at 06:01

## 2019-01-13 RX ADMIN — MORPHINE SULFATE 3 MG: 1 INJECTION, SOLUTION EPIDURAL; INTRATHECAL; INTRAVENOUS at 10:01

## 2019-01-13 RX ADMIN — ROPIVACAINE HYDROCHLORIDE 12 ML: 2 INJECTION, SOLUTION EPIDURAL; INFILTRATION at 12:01

## 2019-01-13 RX ADMIN — SODIUM CHLORIDE, SODIUM LACTATE, POTASSIUM CHLORIDE, AND CALCIUM CHLORIDE 1000 ML: .6; .31; .03; .02 INJECTION, SOLUTION INTRAVENOUS at 11:01

## 2019-01-13 RX ADMIN — CEFAZOLIN 2 G: 330 INJECTION, POWDER, FOR SOLUTION INTRAMUSCULAR; INTRAVENOUS at 09:01

## 2019-01-13 RX ADMIN — SODIUM CHLORIDE, SODIUM LACTATE, POTASSIUM CHLORIDE, AND CALCIUM CHLORIDE: 600; 310; 30; 20 INJECTION, SOLUTION INTRAVENOUS at 06:01

## 2019-01-13 RX ADMIN — SODIUM CHLORIDE, SODIUM LACTATE, POTASSIUM CHLORIDE, AND CALCIUM CHLORIDE: 600; 310; 30; 20 INJECTION, SOLUTION INTRAVENOUS at 09:01

## 2019-01-13 RX ADMIN — AZITHROMYCIN MONOHYDRATE 500 MG: 500 INJECTION, POWDER, LYOPHILIZED, FOR SOLUTION INTRAVENOUS at 09:01

## 2019-01-13 RX ADMIN — HYDROXYZINE PAMOATE 50 MG: 50 CAPSULE ORAL at 01:01

## 2019-01-13 RX ADMIN — LIDOCAINE HYDROCHLORIDE,EPINEPHRINE BITARTRATE 3 ML: 15; .005 INJECTION, SOLUTION EPIDURAL; INFILTRATION; INTRACAUDAL; PERINEURAL at 12:01

## 2019-01-13 NOTE — SUBJECTIVE & OBJECTIVE
Interval History:  Oneyda is a 33 y.o.  at 41w0d. She is doing well.     Objective:     Vital Signs (Most Recent):  Temp: 99.5 °F (37.5 °C) (19 1517)  Pulse: 77 (19 1532)  Resp: 18 (19 1909)  BP: 120/68 (19 1532) Vital Signs (24h Range):  Temp:  [98.3 °F (36.8 °C)-99.5 °F (37.5 °C)] 99.5 °F (37.5 °C)  Pulse:  [69-94] 77  Resp:  [18] 18  BP: ()/(50-76) 120/68     Weight: 93 kg (205 lb)  Body mass index is 36.31 kg/m².    FHT: 145Cat 1 (reassuring)  TOCO:  Q 2-3 minutes    Cervical Exam:  Dilation:  6  Effacement:  75%  Station: -3  Presentation: Vertex     Significant Labs:  Lab Results   Component Value Date    GROUPTRH O POS 2019    STREPBCULT No Group B Streptococcus isolated 12/10/2018       I have personallly reviewed all pertinent lab results from the last 24 hours.    Physical Exam

## 2019-01-13 NOTE — PROGRESS NOTES
S: Pt questioning poc and iol process and timing of delivery, questions answered and pt reassured   O:  VS reviewed, afebrile   Vitals:    19 0818 19 1224 19 1720 19 1909   BP: 116/73 111/74 116/69 118/75   Pulse: 110 94 76 80   Resp:    18   Temp: 98.6 °F (37 °C) 98.2 °F (36.8 °C) 98.5 °F (36.9 °C) 98.6 °F (37 °C)   TempSrc: Oral Oral Oral Oral   Weight:       Height:           FHTs: 145  UC:6-7, ireg pt denies ctx or pain   SVE: /-3 soft bloody show noted     A: IUP @ 40w6d ;     Patient Active Problem List   Diagnosis    Advanced maternal age, primigravida in second trimester, antepartum    Anemia of mother in pregnancy, antepartum    Abnormal pregnancy US       P: Pt requesting to eat and take a 2 hr break, then resume cytotec po 50 mcg q 6   Anticipate    Continue close monitoring of maternal/fetal status

## 2019-01-13 NOTE — ANESTHESIA PROCEDURE NOTES
Epidural    Patient location during procedure: OB   Reason for block: primary anesthetic   Diagnosis: iup   Start time: 1/13/2019 12:10 PM  Timeout: 1/13/2019 12:08 PM  End time: 1/13/2019 12:13 PM  Surgery related to: labor  Staffing  Anesthesiologist: Lizandro Hammond MD  Performed: anesthesiologist   Preanesthetic Checklist  Completed: patient identified, surgical consent, pre-op evaluation, timeout performed, IV checked, risks and benefits discussed, monitors and equipment checked, anesthesia consent given, hand hygiene performed and patient being monitored  Preparation  Patient position: sitting  Prep: Betadine  Patient monitoring: Pulse Ox and Blood Pressure  Epidural  Skin Anesthetic: lidocaine 1%  Skin Wheal: 3 mL  Administration type: continuous  Approach: midline  Interspace: L3-4  Injection technique: CARTER air  Needle and Epidural Catheter  Needle type: Tuohy   Needle gauge: 18  Needle length: 3.5 inches  Needle insertion depth: 6 cm  Catheter type: multi-orifice  Catheter size: 20 G  Catheter at skin depth: 14 cm  Test dose: 3 mL of lidocaine 1.5% with Epi 1-to-200,000  Additional Documentation: incremental injection, negative aspiration for heme and CSF, no paresthesia on injection, no significant pain on injection, no significant complaints from patient and no signs/symptoms of IV or SA injection  Needle localization: anatomical landmarks  Assessment  Upper dermatomal levels - Left: T10  Right: T10   Dermatomal levels determined by alcohol wipe  Ease of block: easy  Patient's tolerance of the procedure: comfortable throughout block and no complaints  Post dural Puncture Headache?: No

## 2019-01-13 NOTE — PROGRESS NOTES
Pt here for US secondary to post term, BPP 4/8 no tone or breathing visualized. Discussed findings with pt, sent to LND for IOL. al

## 2019-01-13 NOTE — PROGRESS NOTES
[facility] [place]  Obstetrics  Labor Progress Note    Patient Name: Oneyda Obando  MRN: [id number]  Admission Date: 2019  Hospital Length of Stay: 2 days  Attending Physician: Kiesha Ash, *  Primary Care Provider: Primary Doctor No    Subjective:     Principal Problem:<principal problem not specified>    Hospital Course:  Admit Desires to eat will initiate cervidil @ 1900, POC discussed with patient and SO, verbalizes understanding   Sheldon bulb inserted    Obstetric HPI:  Patient reports no contractions, active fetal movement, No vaginal bleeding , No loss of fluid     This pregnancy has been complicated by AMA, pregnancy uneventful, BPP today 4 of 8    Obstetric History       T0      L0     SAB0   TAB0   Ectopic0   Multiple0   Live Births0       # Outcome Date GA Lbr Rohit/2nd Weight Sex Delivery Anes PTL Lv   1 Current               Obstetric Comments   G//Panorama WNL     History reviewed. No pertinent past medical history.  Past Surgical History:   Procedure Laterality Date    GANGLION CYST EXCISION         PTA Medications   Medication Sig    acetaminophen (TYLENOL) 500 MG tablet Take 500 mg by mouth every 6 (six) hours as needed for Pain.    doxylamine succinate (UNISOM, DOXYLAMINE,) 25 mg tablet Take 25 mg by mouth nightly as needed for Insomnia.    evening primrose oil (EVENING PRIMROSE ORAL) Take by mouth.    prenatal vit/iron fum/folic ac (PRENATAL 1+1 ORAL) Take by mouth.    valACYclovir (VALTREX) 1000 MG tablet TK 2 TS PO BID       Review of patient's allergies indicates:  No Known Allergies     Family History     None        Tobacco Use    Smoking status: Never Smoker    Smokeless tobacco: Never Used   Substance and Sexual Activity    Alcohol use: No     Frequency: Never    Drug use: No    Sexual activity: Yes     Partners: Male     Review of Systems   Constitutional: Negative.    HENT: Negative.    Eyes: Negative.    Respiratory: Negative.     Cardiovascular: Negative.    Gastrointestinal: Negative.    Endocrine: Negative.    Genitourinary: Negative.    Musculoskeletal: Negative.    Neurological: Negative.    Hematological: Negative.    Psychiatric/Behavioral: Negative.    All other systems reviewed and are negative.  Breast: negative.       Objective:     Vital Signs (Most Recent):  Temp: 98.3 °F (36.8 °C) (19 1720)  Pulse: (!) 118 (19 1720)  Resp: 18 (19 1430)  BP: 120/77 (19 1720) Vital Signs (24h Range):  Temp:  [98.2 °F (36.8 °C)-98.3 °F (36.8 °C)] 98.3 °F (36.8 °C)  Pulse:  [] 118  Resp:  [18] 18  BP: (120)/(77-83) 120/77     Weight: 93 kg (205 lb)  Body mass index is 36.31 kg/m².    FHT: Cat 1 (reassuring)  TOCO: Irregular     Physical Exam:   Constitutional: She is oriented to person, place, and time. She appears well-developed and well-nourished.    HENT:   Head: Normocephalic.    Eyes: Pupils are equal, round, and reactive to light.    Neck: Normal range of motion.    Cardiovascular: Normal rate and regular rhythm.     Pulmonary/Chest: Effort normal and breath sounds normal.        Abdominal: Soft.     Genitourinary: Uterus normal.           Musculoskeletal: Normal range of motion and moves all extremeties.       Neurological: She is alert and oriented to person, place, and time. She has normal reflexes.    Skin: Skin is warm and dry.    Psychiatric: She has a normal mood and affect. Her behavior is normal.   CERVix 50/-2         Significant Labs:  Lab Results   Component Value Date    GROUPTRH O POS 2018    STREPBCULT No Group B Streptococcus isolated 12/10/2018       I have personallly reviewed all pertinent lab results from the last 24 hours.    Assessment/Plan:     33 y.o. female  at 41w0d for:    Abnormal pregnancy US    Admitted for induction  Sheldon bulb inserted  Pitocin begun           Tangela Hayes CNM  Obstetrics  [facility]

## 2019-01-13 NOTE — PROGRESS NOTES
[facility] [place]  Obstetrics  Labor Progress Note    Patient Name: Oneyda Obando  MRN: [id number]  Admission Date: 2019  Hospital Length of Stay: 2 days  Attending Physician: Kiesha Ash, *  Primary Care Provider: Primary Doctor No    Subjective:     Principal Problem:<principal problem not specified>    Hospital Course:  Admit Desires to eat will initiate cervidil @ 1900, POC discussed with patient and SO, verbalizes understanding   Sheldon bulb inserted  AROM 1532  IUPC placed    Interval History:  Oneyda is a 33 y.o.  at 41w0d. She is doing well.     Objective:     Vital Signs (Most Recent):  Temp: 99.5 °F (37.5 °C) (19 1517)  Pulse: 77 (19 153)  Resp: 18 (19 190)  BP: 120/68 (19 153) Vital Signs (24h Range):  Temp:  [98.3 °F (36.8 °C)-99.5 °F (37.5 °C)] 99.5 °F (37.5 °C)  Pulse:  [69-94] 77  Resp:  [18] 18  BP: ()/(50-76) 120/68     Weight: 93 kg (205 lb)  Body mass index is 36.31 kg/m².    FHT: 145Cat 1 (reassuring)  TOCO:  Q 2-3 minutes    Cervical Exam:  Dilation:  6  Effacement:  75%  Station: -3  Presentation: Vertex     Significant Labs:  Lab Results   Component Value Date    GROUPTRH O POS 2019    STREPBCULT No Group B Streptococcus isolated 12/10/2018       I have personallly reviewed all pertinent lab results from the last 24 hours.    Physical Exam    Assessment/Plan:     33 y.o. female  at 41w0d for:    Abnormal pregnancy US    Admitted for induction  Sheldon bulb inserted  Pitocin begun  AROM  IUPC           Tangela Hayes CNM  Obstetrics  [facility]

## 2019-01-13 NOTE — PROGRESS NOTES
CNM at bedside, POC discussed, pt requesting to eat and pause induction, Ok for pt to eat now and resume cytotec 2 hrs after eating, pt verbalized understanding

## 2019-01-13 NOTE — ANESTHESIA PREPROCEDURE EVALUATION
01/13/2019  Oneyda Obando is a 33 y.o., female.    Pre-op Assessment    I have reviewed the Patient Summary Reports.      I have reviewed the Medications.     Review of Systems  Anesthesia Hx:  No previous Anesthesia  Denies Family Hx of Anesthesia complications.    Social:  Non-Smoker    Hematology/Oncology:  Hematology Normal   Oncology Normal     EENT/Dental:EENT/Dental Normal   Cardiovascular:  Cardiovascular Normal     Pulmonary:  Pulmonary Normal    Renal/:  Renal/ Normal     Hepatic/GI:  Hepatic/GI Normal    Musculoskeletal:  Musculoskeletal Normal    OB/GYN/PEDS:  iol post dates   Neurological:  Neurology Normal    Endocrine:  Endocrine Normal    Dermatological:  Skin Normal    Psych:  Psychiatric Normal           Physical Exam  General:  Well nourished    Airway/Jaw/Neck:  Airway Findings: Mouth Opening: Normal General Airway Assessment: Adult  Mallampati: II  Improves to I with phonation.  TM Distance: Normal, at least 6 cm  Jaw/Neck Findings:  Neck ROM: Normal ROM      Dental:  Dental Findings: In tact        Mental Status:  Mental Status Findings:  Cooperative, Alert and Oriented         Anesthesia Plan  Type of Anesthesia, risks & benefits discussed:  Anesthesia Type:  epidural  Patient's Preference:   Intra-op Monitoring Plan: standard ASA monitors  Intra-op Monitoring Plan Comments:   Post Op Pain Control Plan:   Post Op Pain Control Plan Comments:   Induction:    Beta Blocker:  Patient is not currently on a Beta-Blocker (No further documentation required).       Informed Consent: Patient understands risks and agrees with Anesthesia plan.  Questions answered. Anesthesia consent signed with patient.  ASA Score: 2     Day of Surgery Review of History & Physical: I have interviewed and examined the patient. I have reviewed the patient's H&P dated:  There are no significant changes.  H&P update  referred to the provider.

## 2019-01-14 PROCEDURE — 11000001 HC ACUTE MED/SURG PRIVATE ROOM

## 2019-01-14 PROCEDURE — 25000003 PHARM REV CODE 250: Performed by: OBSTETRICS & GYNECOLOGY

## 2019-01-14 PROCEDURE — 63600175 PHARM REV CODE 636 W HCPCS: Performed by: OBSTETRICS & GYNECOLOGY

## 2019-01-14 RX ORDER — ADHESIVE BANDAGE
30 BANDAGE TOPICAL DAILY PRN
Status: DISCONTINUED | OUTPATIENT
Start: 2019-01-14 | End: 2019-01-15 | Stop reason: HOSPADM

## 2019-01-14 RX ORDER — OXYCODONE AND ACETAMINOPHEN 10; 325 MG/1; MG/1
1 TABLET ORAL EVERY 4 HOURS PRN
Status: DISCONTINUED | OUTPATIENT
Start: 2019-01-14 | End: 2019-01-15 | Stop reason: HOSPADM

## 2019-01-14 RX ORDER — DIPHENHYDRAMINE HYDROCHLORIDE 50 MG/ML
25 INJECTION INTRAMUSCULAR; INTRAVENOUS EVERY 4 HOURS PRN
Status: DISCONTINUED | OUTPATIENT
Start: 2019-01-14 | End: 2019-01-15 | Stop reason: HOSPADM

## 2019-01-14 RX ORDER — OXYTOCIN/RINGER'S LACTATE 20/1000 ML
41.65 PLASTIC BAG, INJECTION (ML) INTRAVENOUS CONTINUOUS
Status: DISPENSED | OUTPATIENT
Start: 2019-01-14 | End: 2019-01-14

## 2019-01-14 RX ORDER — OXYCODONE AND ACETAMINOPHEN 5; 325 MG/1; MG/1
1 TABLET ORAL EVERY 4 HOURS PRN
Status: DISCONTINUED | OUTPATIENT
Start: 2019-01-14 | End: 2019-01-15 | Stop reason: HOSPADM

## 2019-01-14 RX ORDER — KETOROLAC TROMETHAMINE 30 MG/ML
30 INJECTION, SOLUTION INTRAMUSCULAR; INTRAVENOUS EVERY 6 HOURS
Status: DISPENSED | OUTPATIENT
Start: 2019-01-14 | End: 2019-01-14

## 2019-01-14 RX ORDER — CHLORHEXIDINE GLUCONATE ORAL RINSE 1.2 MG/ML
10 SOLUTION DENTAL
Status: DISCONTINUED | OUTPATIENT
Start: 2019-01-14 | End: 2019-01-15 | Stop reason: HOSPADM

## 2019-01-14 RX ORDER — IBUPROFEN 800 MG/1
800 TABLET ORAL EVERY 8 HOURS
Status: DISCONTINUED | OUTPATIENT
Start: 2019-01-15 | End: 2019-01-15 | Stop reason: HOSPADM

## 2019-01-14 RX ORDER — SODIUM CITRATE AND CITRIC ACID MONOHYDRATE 334; 500 MG/5ML; MG/5ML
30 SOLUTION ORAL ONCE
Status: DISCONTINUED | OUTPATIENT
Start: 2019-01-14 | End: 2019-01-15 | Stop reason: HOSPADM

## 2019-01-14 RX ORDER — SIMETHICONE 80 MG
1 TABLET,CHEWABLE ORAL EVERY 6 HOURS PRN
Status: DISCONTINUED | OUTPATIENT
Start: 2019-01-14 | End: 2019-01-15 | Stop reason: HOSPADM

## 2019-01-14 RX ORDER — FAMOTIDINE 10 MG/ML
20 INJECTION INTRAVENOUS ONCE
Status: DISCONTINUED | OUTPATIENT
Start: 2019-01-14 | End: 2019-01-15 | Stop reason: HOSPADM

## 2019-01-14 RX ORDER — HYDROMORPHONE HYDROCHLORIDE 1 MG/ML
1 INJECTION, SOLUTION INTRAMUSCULAR; INTRAVENOUS; SUBCUTANEOUS EVERY 4 HOURS PRN
Status: DISCONTINUED | OUTPATIENT
Start: 2019-01-14 | End: 2019-01-15 | Stop reason: HOSPADM

## 2019-01-14 RX ORDER — DOCUSATE SODIUM 100 MG/1
100 CAPSULE, LIQUID FILLED ORAL DAILY
Status: DISCONTINUED | OUTPATIENT
Start: 2019-01-14 | End: 2019-01-15 | Stop reason: HOSPADM

## 2019-01-14 RX ORDER — ONDANSETRON 8 MG/1
8 TABLET, ORALLY DISINTEGRATING ORAL EVERY 8 HOURS PRN
Status: DISCONTINUED | OUTPATIENT
Start: 2019-01-14 | End: 2019-01-15 | Stop reason: HOSPADM

## 2019-01-14 RX ORDER — DIPHENHYDRAMINE HCL 25 MG
25 CAPSULE ORAL EVERY 4 HOURS PRN
Status: DISCONTINUED | OUTPATIENT
Start: 2019-01-14 | End: 2019-01-15 | Stop reason: HOSPADM

## 2019-01-14 RX ORDER — ROPIVACAINE HYDROCHLORIDE 2 MG/ML
INJECTION, SOLUTION EPIDURAL; INFILTRATION CONTINUOUS
Status: DISCONTINUED | OUTPATIENT
Start: 2019-01-14 | End: 2019-01-15 | Stop reason: HOSPADM

## 2019-01-14 RX ORDER — HYDROCORTISONE 25 MG/G
CREAM TOPICAL 3 TIMES DAILY PRN
Status: DISCONTINUED | OUTPATIENT
Start: 2019-01-14 | End: 2019-01-15 | Stop reason: HOSPADM

## 2019-01-14 RX ORDER — BISACODYL 10 MG
10 SUPPOSITORY, RECTAL RECTAL ONCE AS NEEDED
Status: DISCONTINUED | OUTPATIENT
Start: 2019-01-14 | End: 2019-01-15 | Stop reason: HOSPADM

## 2019-01-14 RX ORDER — MISOPROSTOL 200 UG/1
600 TABLET ORAL
Status: DISCONTINUED | OUTPATIENT
Start: 2019-01-14 | End: 2019-01-15 | Stop reason: HOSPADM

## 2019-01-14 RX ORDER — ACETAMINOPHEN 325 MG/1
650 TABLET ORAL EVERY 6 HOURS PRN
Status: DISCONTINUED | OUTPATIENT
Start: 2019-01-14 | End: 2019-01-15 | Stop reason: HOSPADM

## 2019-01-14 RX ADMIN — KETOROLAC TROMETHAMINE 30 MG: 30 INJECTION, SOLUTION INTRAMUSCULAR at 05:01

## 2019-01-14 RX ADMIN — KETOROLAC TROMETHAMINE 30 MG: 30 INJECTION, SOLUTION INTRAMUSCULAR at 11:01

## 2019-01-14 RX ADMIN — IBUPROFEN 800 MG: 800 TABLET ORAL at 11:01

## 2019-01-14 RX ADMIN — DOCUSATE SODIUM 100 MG: 100 CAPSULE, LIQUID FILLED ORAL at 09:01

## 2019-01-14 RX ADMIN — OXYCODONE HYDROCHLORIDE AND ACETAMINOPHEN 1 TABLET: 10; 325 TABLET ORAL at 01:01

## 2019-01-14 RX ADMIN — OXYCODONE HYDROCHLORIDE AND ACETAMINOPHEN 1 TABLET: 5; 325 TABLET ORAL at 08:01

## 2019-01-14 NOTE — ASSESSMENT & PLAN NOTE
POD#1 s/p primary LTCS late last night.  Patient doing well.  Sheldon remains until 10 am this morning.  Pain well controlled at this point.  Patient eating breakfast.  Encouraged ambulation later today.

## 2019-01-14 NOTE — PROGRESS NOTES
[facility] -   Obstetrics  Postpartum Progress Note    Patient Name: Oneyda Obando  MRN: [id number]  Admission Date: 2019  Hospital Length of Stay: 3 days  Attending Physician: Kiesha Ash, *  Primary Care Provider: Primary Doctor No    Subjective:     Principal Problem:S/P primary low transverse     Hospital course: Admit Desires to eat will initiate cervidil @ 1900, POC discussed with patient and SO, verbalizes understanding   Bunch bulb inserted  AROM 1532  IUPC placed  Pushed x 2 hours with poor progress  Will proceed to LTCS    Interval History:     She is doing well this morning. She is tolerating a regular diet without nausea or vomiting. She is not voiding spontaneously yet, as bunch is still in place. She is not ambulating until bunch removal. She has passed flatus, and has not a BM. Vaginal bleeding is mild. She denies fever or chills. Abdominal pain is mild and controlled with oral medications. She is breastfeeding. She desires circumcision for her male baby: yes.     Objective:     Vital Signs (Most Recent):  Temp: 98.2 °F (36.8 °C) (19 023)  Pulse: 86 (19 023)  Resp: 16 (19 023)  BP: 114/75 (19 023)  SpO2: 99 % (19 2254) Vital Signs (24h Range):  Temp:  [98.2 °F (36.8 °C)-99.5 °F (37.5 °C)] 98.2 °F (36.8 °C)  Pulse:  [] 86  Resp:  [0-20] 16  SpO2:  [98 %-100 %] 99 %  BP: ()/(46-89) 114/75     Weight: 93 kg (205 lb)  Body mass index is 36.31 kg/m².      Intake/Output Summary (Last 24 hours) at 2019 0816  Last data filed at 2019 0545  Gross per 24 hour   Intake 400 ml   Output 1600 ml   Net -1200 ml       Significant Labs:  Lab Results   Component Value Date    GROUPTRH O POS 2019    STREPBCULT No Group B Streptococcus isolated 12/10/2018     No results for input(s): HGB, HCT in the last 48 hours.    I have personallly reviewed all pertinent lab results from the last 24 hours.  Recent Lab Results     None           Physical Exam:   Constitutional: She is oriented to person, place, and time. She appears well-developed and well-nourished.    HENT:   Head: Normocephalic.     Neck: Normal range of motion. No thyromegaly present.    Cardiovascular: Normal rate and regular rhythm.     Pulmonary/Chest: Effort normal and breath sounds normal.        Abdominal: Soft. Bowel sounds are normal. She exhibits abdominal incision (aquasel dressing with pressure dressing in tact).     Genitourinary:   Genitourinary Comments: Fundus nontender near the umbilicus             Musculoskeletal: Normal range of motion and moves all extremeties. She exhibits no edema.       Neurological: She is alert and oriented to person, place, and time. She has normal reflexes.    Skin: Skin is warm.    Psychiatric: She has a normal mood and affect.       Assessment/Plan:     33 y.o. female  for:    * S/P primary low transverse     POD#1 s/p primary LTCS late last night.  Patient doing well.  Sheldon remains until 10 am this morning.  Pain well controlled at this point.  Patient eating breakfast.  Encouraged ambulation later today.           Abnormal pregnancy US    Admitted for induction  Sheldon bulb inserted  Pitocin begun  AROM  IUPC  Proceed to LTCS         Disposition: As patient meets milestones, will plan to discharge within 48 hours.    Carol Ann Pedraza PA-C  Obstetrics  [facility]

## 2019-01-14 NOTE — PROGRESS NOTES
aquacel dressing removed, incision line well approximated with intact sutures, no active bleeding noted, new aquacel dressing applied with ABD pressure dressing applied on top, pt tolerated well, will continue to monitor

## 2019-01-14 NOTE — PROGRESS NOTES
Pt tolerated transfer to MBU well. No issues noted currently; no c/o pain or discomfort. Vitals stable. Will continue to monitor.

## 2019-01-14 NOTE — TRANSFER OF CARE
"Anesthesia Transfer of Care Note    Patient: Oneyda Obando    Procedure(s) Performed: * No procedures listed *    Patient location: Labor and Delivery    Anesthesia Type: epidural    Transport from OR: Transported from OR on room air with adequate spontaneous ventilation    Post pain: adequate analgesia    Post assessment: no apparent anesthetic complications    Post vital signs: stable    Level of consciousness: awake    Nausea/Vomiting: no nausea/vomiting    Complications: none    Transfer of care protocol was followed      Last vitals:   Visit Vitals  /69 (BP Location: Left arm, Patient Position: Lying)   Pulse 97   Temp 37.5 °C (99.5 °F) (Oral)   Resp 18   Ht 5' 3" (1.6 m)   Wt 93 kg (205 lb)   SpO2 99%   Breastfeeding? No   BMI 36.31 kg/m²     "

## 2019-01-14 NOTE — LACTATION NOTE
Lactation Rounds: Lactation packet given and admit information reviewed. Mother verbalizes understanding of expected  behaviors and output for the first 48 hours of life.  Discussed the importance of cue based feedings on demand, unrestricted access to the breast, and frequent uninterrupted skin to skin contact.  Risk and implications of artificial nipples and non medically indicated formula supplementation discussed.      Infant exhibiting feeding cues. Assisted with positioning infant to left breast in football hold, instructed regarding latch technique. With assistance and after many attempts, infant obtained adequate latch and settled into nutritive feeding. Mother denies pain with latch, a few audible swallows noted.infant released breast, rooting. Mother able to return demonstrate latch technique and latch infant deeply to breast with additional audible swallows. Infant fed until content, released breast and awake. Nipple minimally compressed upon unlatching. Instructed regarding hand expression and nipple care, mother able to return demonstrate and easily obtained a few drops of colostrum. Infant skin to skin, rooting. Some assistance provided with positioning to right breast in football hold. Mother independently attempted to latch infant, infant with no attempts to latch and now sleepy. Infant swaddled and placed in bassinet per mothers request. WIll call for assistance as needed.     19 1030   Maternal Assessment   Breast Shape Bilateral:;round   Breast Density Bilateral:;soft   Areola Bilateral:;elastic   Nipples Bilateral:;everted   Maternal Infant Feeding   Infant Positioning clutch/football   Signs of Milk Transfer audible swallow;infant jaw motion present   Comfort Measures Before/During Feeding infant position adjusted;maternal position adjusted   Comfort Measures Following Feeding expressed milk applied   Nipple Shape After Feeding, Left minimal compression   Latch Assistance yes

## 2019-01-14 NOTE — NURSING
Patient's catheter removed with catheter tip intact. Pressure dressing removed. No drainage noted under dressing and marked. Patient tolerated move to bathroom with minimal assistance. Fundus firm without massage. Will continue to monitor.

## 2019-01-14 NOTE — SUBJECTIVE & OBJECTIVE
Interval History:  Oneyda is a 33 y.o.  at 41w0d. She is doing well. Epidural redosed for comfort. Prep for c section consents signed    Objective:     Vital Signs (Most Recent):  Temp: 99.3 °F (37.4 °C) (19 1602)  Pulse: 78 (19 1747)  Resp: 18 (19 1909)  BP: 137/83 (19 1747) Vital Signs (24h Range):  Temp:  [98.3 °F (36.8 °C)-99.5 °F (37.5 °C)] 99.4 °F (37.4 °C)  Pulse:  [] 78  Resp:  [18] 18  BP: ()/(50-83) 137/83     Weight: 93 kg (205 lb)  Body mass index is 36.31 kg/m².    FHT: 150Cat 1 (reassuring)  TOCO:  Q 3 minutes    Cervical Exam:  Dilation:  7  Effacement:  75%  Station: -2  Presentation: Vertex     Significant Labs:  Lab Results   Component Value Date    GROUPTRH O POS 2019    STREPBCULT No Group B Streptococcus isolated 12/10/2018       I have personallly reviewed all pertinent lab results from the last 24 hours.    Physical Exam

## 2019-01-14 NOTE — OP NOTE
OPERATIVE NOTE    DATE OF PROCEDURE:  2019    PREOPERATIVE DIAGNOSIS:    1.  Pregnancy at 41 weeks EGA  2.  Arrest of descent  3.  OP presentation    POSTOPERATIVE DIAGNOSIS   1.  Pregnancy at 41 weeks EGA  2.  Arrest of descent  3.  OP presentation  4.  Viable male infant    OPERATIVE PROCEDURE:  Primary  Low Transverse  Section    SURGEON:  Kiesha Ash MD    ASSISTANT:  JANENE Philip - medically needed for procedure    ANESTHESIA:  Epidural    ESTIMATED BLOOD LOSS:  800 ml    FINDINGS:  Normal uterus, tubes, and ovaries.  Viable male infant    PROSTETICS/DEVICES: None    COMPLICATIONS:  None    SPECIMEN:  None     PROCEDURE IN DETAIL:   The procedure was explained to the patient and informed consent was obtained. The patient was brought to the operating room where epidural anesthesia was administered. A bunch catheter was placed, and she was prepped and draped in the usual sterile manner. A time out was performed. A pfannensteil skin incision was made with the scalpel and the incision was extended sharply to the rectus fascia. The fascial incision was extended bilaterally with mcelroy scissors and grasped with [facility] clamps. The fascia was dissected off of the underlying rectus muscles both superiorly and inferiorly. The rectus muscles were divided in the midline with sharp and blunt dissection. The peritoneum was grasped between two hemostats and entered sharply. The peritoneal incision was extended bluntly. A bladder retractor was placed. The vesicouterine peritoneum was incised with metzenbaum scissors and extended laterally to create the bladder flap. The bladder flap was taken down with blunt dissection. The lower uterine segment was incised in a transverse fashion using the scapel. This incision was extended laterally with bandage scissors. Membranes were ruptured with clear fluid noted. The infant was delivered from the vertex position without difficulty. The nose and mouth were  suctioned and the umbilical cord was doubly clamped and cut. The infant was handed off to the nursery staff in attendance. The placenta was delivered spontaneously and the uterus was cleared of all clots and debris. The uterine incision was closed with a number one Chromic suture in a running locking fashion. A double layer closure was performed.  Uterine atony noted initially after delivery of placenta, which responded to massage and administration of IV pitocin. Good hemostasis was noted. The pelvis was irrigated and was well suctioned of all remaining blood clots. The rectus muscles were closed with 2-0 Vicryl suture with interrupted figure of eight sutures. The fascia was closed with a 0 looped PDS suture in a running fashion. The skin incision was well irrigated and closed with 4-0 Vicryl in a subcuticular fashion. Steri strips and a sterile dressing were placed over the incision. The patient tolerated the procedure well and was brought to the recovery room in stable condition. All counts were correct x 3.

## 2019-01-14 NOTE — ANESTHESIA POSTPROCEDURE EVALUATION
"Anesthesia Post Evaluation    Patient: Oneyda Obando    Procedure(s) Performed: * No procedures listed *    Final Anesthesia Type: epidural  Patient location during evaluation: labor & delivery  Patient participation: Yes- Able to Participate  Level of consciousness: awake and alert  Post-procedure vital signs: reviewed and stable  Pain management: adequate  Airway patency: patent  PONV status at discharge: No PONV  Anesthetic complications: no      Cardiovascular status: hemodynamically stable  Respiratory status: unassisted, spontaneous ventilation and room air  Hydration status: euvolemic  Follow-up not needed.        Visit Vitals  /69 (BP Location: Left arm, Patient Position: Lying)   Pulse 97   Temp 37.5 °C (99.5 °F) (Oral)   Resp 18   Ht 5' 3" (1.6 m)   Wt 93 kg (205 lb)   SpO2 99%   Breastfeeding? No   BMI 36.31 kg/m²       Pain/Carlee Score: Pain Rating Prior to Med Admin: 8 (1/13/2019 10:45 PM)  Pain Rating Post Med Admin: 0 (1/12/2019  3:34 PM)        "

## 2019-01-14 NOTE — PROGRESS NOTES
[facility]  Obstetrics  Labor Progress Note    Patient Name: Oneyda Obando  MRN: [id number]  Admission Date: 2019  Hospital Length of Stay: 2 days  Attending Physician: Kiesha Ash, *  Primary Care Provider: Primary Doctor No    Subjective:     Principal Problem:<principal problem not specified>    Hospital Course:  Admit Desires to eat will initiate cervidil @ 1900, POC discussed with patient and SO, verbalizes understanding   Sheldon bulb inserted  AROM 1532  IUPC placed  Pushed x 2 hours with poor progress  Will proceed to LTCS    Interval History:  Oneyda is a 33 y.o.  at 41w0d. She is doing well. Epidural redosed for comfort. Prep for c section consents signed    Objective:     Vital Signs (Most Recent):  Temp: 99.3 °F (37.4 °C) (19 1602)  Pulse: 78 (19 1747)  Resp: 18 (19 1909)  BP: 137/83 (19 1747) Vital Signs (24h Range):  Temp:  [98.3 °F (36.8 °C)-99.5 °F (37.5 °C)] 99.4 °F (37.4 °C)  Pulse:  [] 78  Resp:  [18] 18  BP: ()/(50-83) 137/83     Weight: 93 kg (205 lb)  Body mass index is 36.31 kg/m².    FHT: 150Cat 1 (reassuring)  TOCO:  Q 3 minutes    Cervical Exam:  Dilation:  7  Effacement:  75%  Station: -2  Presentation: Vertex     Significant Labs:  Lab Results   Component Value Date    GROUPTRH O POS 2019    STREPBCULT No Group B Streptococcus isolated 12/10/2018       I have personallly reviewed all pertinent lab results from the last 24 hours.    Physical Exam    Assessment/Plan:     33 y.o. female  at 41w0d for:    Abnormal pregnancy US    Admitted for induction  Sheldon bulb inserted  Pitocin begun  AROM  IUPC  Proceed to LTCS           Tangela Hayes CNM  Obstetrics  [facility]

## 2019-01-14 NOTE — PROGRESS NOTES
Notified Dr. Ash of saturated aquacel dressing via secure chat, ok to change aquacel and apply pressure dressing

## 2019-01-14 NOTE — SUBJECTIVE & OBJECTIVE
Hospital course: Admit Desires to eat will initiate cervidil @ 1900, POC discussed with patient and SO, verbalizes understanding   Bunch bulb inserted  AROM 1532  IUPC placed  Pushed x 2 hours with poor progress  Will proceed to LTCS    Interval History:     She is doing well this morning. She is tolerating a regular diet without nausea or vomiting. She is not voiding spontaneously yet, as bunch is still in place. She is not ambulating until bunch removal. She has passed flatus, and has not a BM. Vaginal bleeding is mild. She denies fever or chills. Abdominal pain is mild and controlled with oral medications. She is breastfeeding. She desires circumcision for her male baby: yes.     Objective:     Vital Signs (Most Recent):  Temp: 98.2 °F (36.8 °C) (01/14/19 0235)  Pulse: 86 (01/14/19 0235)  Resp: 16 (01/14/19 0235)  BP: 114/75 (01/14/19 0235)  SpO2: 99 % (01/13/19 2254) Vital Signs (24h Range):  Temp:  [98.2 °F (36.8 °C)-99.5 °F (37.5 °C)] 98.2 °F (36.8 °C)  Pulse:  [] 86  Resp:  [0-20] 16  SpO2:  [98 %-100 %] 99 %  BP: ()/(46-89) 114/75     Weight: 93 kg (205 lb)  Body mass index is 36.31 kg/m².      Intake/Output Summary (Last 24 hours) at 1/14/2019 0816  Last data filed at 1/14/2019 0545  Gross per 24 hour   Intake 400 ml   Output 1600 ml   Net -1200 ml       Significant Labs:  Lab Results   Component Value Date    GROUPTRH O POS 01/11/2019    STREPBCULT No Group B Streptococcus isolated 12/10/2018     No results for input(s): HGB, HCT in the last 48 hours.    I have personallly reviewed all pertinent lab results from the last 24 hours.  Recent Lab Results     None          Physical Exam:   Constitutional: She is oriented to person, place, and time. She appears well-developed and well-nourished.    HENT:   Head: Normocephalic.     Neck: Normal range of motion. No thyromegaly present.    Cardiovascular: Normal rate and regular rhythm.     Pulmonary/Chest: Effort normal and breath sounds normal.         Abdominal: Soft. Bowel sounds are normal. She exhibits abdominal incision (aquasel dressing with pressure dressing in tact).     Genitourinary:   Genitourinary Comments: Fundus nontender near the umbilicus             Musculoskeletal: Normal range of motion and moves all extremeties. She exhibits no edema.       Neurological: She is alert and oriented to person, place, and time. She has normal reflexes.    Skin: Skin is warm.    Psychiatric: She has a normal mood and affect.

## 2019-01-14 NOTE — ANESTHESIA RELEASE NOTE
"Anesthesia Release from PACU Note    Patient: Oneyda Obando    Procedure(s) Performed: * No procedures listed *    Anesthesia type: epidural    Post pain: Adequate analgesia    Post assessment: no apparent anesthetic complications    Last Vitals:   Visit Vitals  /69 (BP Location: Left arm, Patient Position: Lying)   Pulse 97   Temp 37.5 °C (99.5 °F) (Oral)   Resp 18   Ht 5' 3" (1.6 m)   Wt 93 kg (205 lb)   SpO2 99%   Breastfeeding? No   BMI 36.31 kg/m²       Post vital signs: stable    Level of consciousness: awake    Nausea/Vomiting: no nausea/no vomiting    Complications: none    Airway Patency: patent    Respiratory: unassisted, spontaneous ventilation, room air    Cardiovascular: stable and blood pressure at baseline    Hydration: euvolemic  "

## 2019-01-14 NOTE — PLAN OF CARE
Problem: Adult Inpatient Plan of Care  Goal: Plan of Care Review  Outcome: Ongoing (interventions implemented as appropriate)  VSS. Patient is has been ambulating and up to bathroom without difficulty. Pain is controlled with oral pain medications. Breastfeeding without difficulty. Bonding well with infant

## 2019-01-15 ENCOUNTER — TELEPHONE (OUTPATIENT)
Dept: OBSTETRICS AND GYNECOLOGY | Facility: CLINIC | Age: 34
End: 2019-01-15

## 2019-01-15 VITALS
TEMPERATURE: 98 F | OXYGEN SATURATION: 99 % | HEIGHT: 63 IN | BODY MASS INDEX: 36.32 KG/M2 | RESPIRATION RATE: 20 BRPM | HEART RATE: 87 BPM | DIASTOLIC BLOOD PRESSURE: 68 MMHG | SYSTOLIC BLOOD PRESSURE: 106 MMHG | WEIGHT: 205 LBS

## 2019-01-15 PROCEDURE — 25000003 PHARM REV CODE 250: Performed by: OBSTETRICS & GYNECOLOGY

## 2019-01-15 RX ORDER — OXYCODONE AND ACETAMINOPHEN 5; 325 MG/1; MG/1
1 TABLET ORAL EVERY 4 HOURS PRN
Qty: 20 TABLET | Refills: 0 | Status: SHIPPED | OUTPATIENT
Start: 2019-01-15 | End: 2019-01-21

## 2019-01-15 RX ORDER — IBUPROFEN 800 MG/1
800 TABLET ORAL 3 TIMES DAILY
Qty: 30 TABLET | Refills: 0 | Status: SHIPPED | OUTPATIENT
Start: 2019-01-15 | End: 2019-01-25

## 2019-01-15 RX ADMIN — OXYCODONE HYDROCHLORIDE AND ACETAMINOPHEN 1 TABLET: 5; 325 TABLET ORAL at 05:01

## 2019-01-15 RX ADMIN — IBUPROFEN 800 MG: 800 TABLET ORAL at 05:01

## 2019-01-15 RX ADMIN — OXYCODONE HYDROCHLORIDE AND ACETAMINOPHEN 1 TABLET: 10; 325 TABLET ORAL at 10:01

## 2019-01-15 RX ADMIN — OXYCODONE HYDROCHLORIDE AND ACETAMINOPHEN 1 TABLET: 5; 325 TABLET ORAL at 12:01

## 2019-01-15 RX ADMIN — DOCUSATE SODIUM 100 MG: 100 CAPSULE, LIQUID FILLED ORAL at 08:01

## 2019-01-15 NOTE — DISCHARGE INSTRUCTIONS

## 2019-01-15 NOTE — DISCHARGE SUMMARY
[facility] -   Obstetrics  Discharge Summary      Patient Name: Oneyda Obando  MRN: [id number]  Admission Date: 2019  Hospital Length of Stay: 4 days  Discharge Date and Time:  01/15/2019 9:21 AM  Attending Physician: Kiesha Leach, *   Discharging Provider: Yessenia Mono MD  Primary Care Provider: Primary Doctor No    HPI: Sent from office for IOL secondary to BPP 4 of 8    Procedure(s) (LRB):   SECTION (N/A)     Hospital Course:   Admit Desires to eat will initiate cervidil @ 1900, POC discussed with patient and SO, verbalizes understanding   Sheldon bulb inserted  AROM 1532  IUPC placed  Pushed x 2 hours with poor progress  Will proceed to LTCS  01/15/2019--stable for discharge on pod#2    Consults (From admission, onward)        Status Ordering Provider     Consult to Lactation  Use PRN     Provider:  (Not yet assigned)    Acknowledged KIESHA LEACH          Final Active Diagnoses:    Diagnosis Date Noted POA    PRINCIPAL PROBLEM:  S/P primary low transverse  [Z98.891] 2019 Not Applicable    Abnormal pregnancy US [O28.3] 2019 Yes      Problems Resolved During this Admission:        Labs: BMP: No results for input(s): GLU, NA, K, CL, CO2, BUN, CREATININE, CALCIUM, MG in the last 48 hours., CBC No results for input(s): WBC, HGB, HCT, PLT in the last 48 hours. and All labs within the past 24 hours have been reviewed    Feeding Method: breast    Immunizations     Date Immunization Status Dose Route/Site Given by    01/15/19 0710 MMR Deferred 0.5 mL Subcutaneous/Left deltoid Nae Rudolph RN    01/15/19 0711 Tdap Deferred (Other) 0.5 mL Intramuscular/Left deltoid Nae Rudolph RN          Delivery:    Episiotomy: None   Lacerations: None   Repair suture:     Repair # of packets:     Blood loss (ml): 0     Birth information:  YOB: 2021   Time of birth: 10:03 PM   Sex: male   Delivery type: , Low Transverse   Gestational Age:  41w0d    Delivery Clinician:      Other providers:       Additional  information:  Forceps:    Vacuum:    Breech:    Observed anomalies      Living?:           APGARS  One minute Five minutes Ten minutes   Skin color:         Heart rate:         Grimace:         Muscle tone:         Breathing:         Totals: 9  9        Placenta: Delivered:       appearance    Pending Diagnostic Studies:     None          Discharged Condition: good    Disposition: Home or Self Care    Follow Up:  Follow-up Information     NURSE, OB-GYN In 1 week.    Specialty:  Obstetrics and Gynecology  Why:  dressing removal           Kiesha Ash MD In 4 weeks.    Specialties:  Obstetrics, Obstetrics and Gynecology  Why:  post op ck  Contact information:  [address] [place] [address] DR Brigette ANNE [id number]  [phone]                 Patient Instructions:      Diet Adult Regular     Call MD for:  temperature >100.4     Call MD for:  persistent nausea and vomiting or diarrhea     Call MD for:  severe uncontrolled pain     Call MD for:  redness, tenderness, or signs of infection (pain, swelling, redness, odor or green/yellow discharge around incision site)     Call MD for:  difficulty breathing or increased cough     Call MD for:  persistent dizziness, light-headedness, or visual disturbances     Call MD for:   Order Comments: Bleeding more than 1 pad per hour     Other restrictions (specify):   Order Comments: Pelvic rest x 6 weeks (no tampons, intercourse douching); showers only for 6 wks; no lifting more than baby     Medications:  Current Discharge Medication List      START taking these medications    Details   ibuprofen (ADVIL,MOTRIN) 800 MG tablet Take 1 tablet (800 mg total) by mouth 3 (three) times daily. for 10 days  Qty: 30 tablet, Refills: 0      oxyCODONE-acetaminophen (PERCOCET) 5-325 mg per tablet Take 1 tablet by mouth every 4 (four) hours as needed.  Qty: 20 tablet, Refills: 0         CONTINUE these medications which have  NOT CHANGED    Details   prenatal vit/iron fum/folic ac (PRENATAL 1+1 ORAL) Take by mouth.      valACYclovir (VALTREX) 1000 MG tablet TK 2 TS PO BID  Refills: 3         STOP taking these medications       acetaminophen (TYLENOL) 500 MG tablet Comments:   Reason for Stopping:         doxylamine succinate (UNISOM, DOXYLAMINE,) 25 mg tablet Comments:   Reason for Stopping:         evening primrose oil (EVENING PRIMROSE ORAL) Comments:   Reason for Stopping:               Yessenia Moon MD  Obstetrics  [facility]

## 2019-01-15 NOTE — PROGRESS NOTES
[facility] -   Obstetrics  Postpartum Progress Note    Patient Name: Oneyda Obando  MRN: [id number]  Admission Date: 2019  Hospital Length of Stay: 4 days  Attending Physician: Kiesha Ash, *  Primary Care Provider: Primary Doctor No    Subjective:     Principal Problem:S/P primary low transverse     Hospital course: Admit Desires to eat will initiate cervidil @ 1900, POC discussed with patient and SO, verbalizes understanding   Sheldon bulb inserted  AROM 1532  IUPC placed  Pushed x 2 hours with poor progress  Will proceed to LTCS  01/15/2019--stable for discharge on pod#2    Interval History:   Pod #2    She is doing well this morning. She is tolerating a regular diet without nausea or vomiting. She is voiding spontaneously. She is ambulating. She has passed flatus, and has not a BM. Vaginal bleeding is mild. She denies fever or chills. Abdominal pain is mild and controlled with oral medications. She is breastfeeding. She desires circumcision for her male baby: yes.    Objective:     Vital Signs (Most Recent):  Temp: 98.2 °F (36.8 °C) (01/15/19 0759)  Pulse: 83 (01/15/19 0759)  Resp: 20 (01/15/19 0759)  BP: (!) 97/51 (01/15/19 0759)  SpO2: 99 % (19 2254) Vital Signs (24h Range):  Temp:  [97.4 °F (36.3 °C)-98.6 °F (37 °C)] 98.2 °F (36.8 °C)  Pulse:  [80-95] 83  Resp:  [18-20] 20  BP: ()/(51-65) 97/51     Weight: 93 kg (205 lb)  Body mass index is 36.31 kg/m².      Intake/Output Summary (Last 24 hours) at 1/15/2019 0915  Last data filed at 2019 1800  Gross per 24 hour   Intake --   Output 2100 ml   Net -2100 ml       Significant Labs:  Lab Results   Component Value Date    GROUPTRH O POS 2019    STREPBCULT No Group B Streptococcus isolated 12/10/2018     No results for input(s): HGB, HCT in the last 48 hours.    I have personallly reviewed all pertinent lab results from the last 24 hours.  Recent Lab Results     None          Physical Exam:   Constitutional:  She is oriented to person, place, and time. She appears well-developed.    HENT:   Head: Normocephalic.    Eyes: Pupils are equal, round, and reactive to light.    Neck: Normal range of motion.    Cardiovascular: Normal rate and regular rhythm.     Pulmonary/Chest: Effort normal and breath sounds normal.        Abdominal: Soft. Bowel sounds are normal. She exhibits no abdominal incision (aquasel dressing intact).     Genitourinary:   Genitourinary Comments: Fundus firm, non tender           Musculoskeletal: Normal range of motion.       Neurological: She is alert and oriented to person, place, and time.    Skin: Skin is warm and dry.    Psychiatric: She has a normal mood and affect. Her behavior is normal. Judgment and thought content normal.       Assessment/Plan:     33 y.o. female  for:    * S/P primary low transverse     POD#2 s/p primary LTCS   Afebrile overnight  Desires discharge          Abnormal pregnancy US    Admitted for induction  Sheldon bulb inserted  Pitocin begun  AROM  IUPC  Proceed to LTCS         Disposition: As patient meets milestones, will plan to discharge today.    Yessenia Moon MD  Obstetrics  [facility]

## 2019-01-15 NOTE — SUBJECTIVE & OBJECTIVE
Hospital course: Admit Desires to eat will initiate cervidil @ 1900, POC discussed with patient and SO, verbalizes understanding   Sheldon bulb inserted  AROM 1532  IUPC placed  Pushed x 2 hours with poor progress  Will proceed to LTCS  01/15/2019--stable for discharge on pod#2    Interval History:   Pod #2    She is doing well this morning. She is tolerating a regular diet without nausea or vomiting. She is voiding spontaneously. She is ambulating. She has passed flatus, and has not a BM. Vaginal bleeding is mild. She denies fever or chills. Abdominal pain is mild and controlled with oral medications. She is breastfeeding. She desires circumcision for her male baby: yes.    Objective:     Vital Signs (Most Recent):  Temp: 98.2 °F (36.8 °C) (01/15/19 0759)  Pulse: 83 (01/15/19 0759)  Resp: 20 (01/15/19 0759)  BP: (!) 97/51 (01/15/19 0759)  SpO2: 99 % (01/13/19 2254) Vital Signs (24h Range):  Temp:  [97.4 °F (36.3 °C)-98.6 °F (37 °C)] 98.2 °F (36.8 °C)  Pulse:  [80-95] 83  Resp:  [18-20] 20  BP: ()/(51-65) 97/51     Weight: 93 kg (205 lb)  Body mass index is 36.31 kg/m².      Intake/Output Summary (Last 24 hours) at 1/15/2019 0915  Last data filed at 1/14/2019 1800  Gross per 24 hour   Intake --   Output 2100 ml   Net -2100 ml       Significant Labs:  Lab Results   Component Value Date    GROUPTRH O POS 01/11/2019    STREPBCULT No Group B Streptococcus isolated 12/10/2018     No results for input(s): HGB, HCT in the last 48 hours.    I have personallly reviewed all pertinent lab results from the last 24 hours.  Recent Lab Results     None          Physical Exam:   Constitutional: She is oriented to person, place, and time. She appears well-developed.    HENT:   Head: Normocephalic.    Eyes: Pupils are equal, round, and reactive to light.    Neck: Normal range of motion.    Cardiovascular: Normal rate and regular rhythm.     Pulmonary/Chest: Effort normal and breath sounds normal.        Abdominal: Soft. Bowel  sounds are normal. She exhibits no abdominal incision (aquasel dressing intact).     Genitourinary:   Genitourinary Comments: Fundus firm, non tender           Musculoskeletal: Normal range of motion.       Neurological: She is alert and oriented to person, place, and time.    Skin: Skin is warm and dry.    Psychiatric: She has a normal mood and affect. Her behavior is normal. Judgment and thought content normal.

## 2019-01-15 NOTE — LACTATION NOTE
Lactation rounds  Infant weight loss and output is WDL. Mother atched baby in a football position on the left breast with the assistance of FOB. Latch seems adequate, mother denies pain or discomfort.   Baby fed until content, burped, and is now sleepy. Nipple shape and color is WDL upon unlatching.   Lactation discharge information reviewed.  Mother is aware of warm line, and outpatient consultations and community ressources. Encouraged mother to contact lactation with any questions, concerns, or problems. Contact numbers provided, and mother verbalizes understanding.

## 2019-01-15 NOTE — PLAN OF CARE
Problem: Adult Inpatient Plan of Care  Goal: Absence of Hospital-Acquired Illness or Injury    Intervention: Prevent VTE (venous thromboembolism)  VSS.Viable pt. Fundus below umbilicus. Lochia rubra and light. Breastfeeding infant. Safety maintained. Pt and infant appear to be bonding well. Progress will continue to be monitored.

## 2019-01-15 NOTE — L&D DELIVERY NOTE
[facility]   Section   Operative Note    SUMMARY     Date of Procedure: 2019     Procedure: Procedure(s) (LRB):   SECTION (N/A)    Surgeon(s) and Role:     * Kiesha Ash MD - Primary      Delivery Information for  French Obando    Birth information:  YOB: 2021   Time of birth: 10:03 PM   Sex: male   Head Delivery Date/Time: 2019 10:03 PM   Delivery type: , Low Transverse   Gestational Age: 41w0d    Delivery Providers    Delivering clinician:  Kiesha Ash MD   Provider Role    Rina Rob RN Registered Nurse    Carol Ann Pedraza PAAmayaC Assisting Surgeon    Lillian Argueta Surgical Tech    Tahmina Huynh CRNA Nurse Anesthetist    Edna Joyner RN Registered Nurse            Measurements    Weight:  3500 g  Length:  55 cm  Head circumference:  33.5 cm  Chest circumference:  34 cm  Abdominal girth:  29 cm         Apgars    Living status:  Living  Apgars:   1 min.:   5 min.:   10 min.:   15 min.:   20 min.:     Skin color:   1  1       Heart rate:   2  2       Reflex irritability:   2  2       Muscle tone:   2  2       Respiratory effort:   2  2       Total:   9  9       Apgars assigned by:  ALEK JOYNER RN         Operative Delivery    Forceps attempted?:  No  Vacuum extractor attempted?:  No         Shoulder Dystocia    Shoulder dystocia present?:  No           Presentation    Presentation:  Vertex  Position:  Occiput Posterior           Interventions/Resuscitation    Method:  Bulb Suctioning, Tactile Stimulation, Deep Suctioning       Cord    Vessels:  3 vessels  Complications:  None  Delayed Cord Clamping?:  Yes  Cord Clamped Date/Time:  2019 10:04 PM  Cord Blood Disposition:  Lab  Gases Sent?:  No  Stem Cell Collection (by MD):  No       Placenta    Placenta delivery date/time:  2019 2205  Placenta removal:  Manual removal  Placenta appearance:  Intact  Placenta disposition:  discarded           Labor Events:        labor: No     Labor Onset Date/Time:         Dilation Complete Date/Time:         Start Pushing Date/Time:       Rupture Date/Time:              Rupture type:           Fluid Amount:        Fluid Color:        Fluid Odor:        Membrane Status (PeriCalm): ARM (Artificial Rupture)      Rupture Date/Time (PeriCalm): 2019 13:42:00      Fluid Amount (PeriCalm): Moderate      Fluid Color (PeriCalm): Clear       steroids: None     Antibiotics given for GBS: No     Induction: dinoprostone gel;balloon dilation (Sheldon);misoprostol     Indications for induction:  Post-term Gestation     Augmentation: oxytocin;amniotomy     Indications for augmentation: Ineffective Contraction Pattern     Labor complications: Failure to Progress in Second Stage     Additional complications:          Cervical ripening:          Misoprostol;Cervidil;Sheldon/EASI          Delivery:      Episiotomy: None     Indication for Episiotomy:       Perineal Lacerations: None Repaired:      Periurethral Laceration: none Repaired:     Labial Laceration: none Repaired:     Sulcus Laceration: none Repaired:     Vaginal Laceration: No Repaired:     Cervical Laceration: No Repaired:     Repair suture:       Repair # of packets:       Vaginal delivery QBL (mL): 0      QBL (mL): 0     Combined Blood Loss (mL): 0     Vaginal Sweep Performed:       Surgicount Correct:         Other providers:       Anesthesia    Method:  Epidural          Details (if applicable):  Trial of Labor Yes    Categorization: Primary    Priority: Routine   Indications for : Malposition;Arrest of Descent   Incision Type: low transverse     Additional  information:  Forceps:    Vacuum:    Breech:    Observed anomalies    Other (Comments):

## 2019-01-15 NOTE — TELEPHONE ENCOUNTER
Shobha with [employer] called to confirm pt's delivery date and type of delivery. Informed her pt's delivery date was 1-13-19 and CSection delivery.

## 2019-01-16 ENCOUNTER — TELEPHONE (OUTPATIENT)
Dept: OBSTETRICS AND GYNECOLOGY | Facility: HOSPITAL | Age: 34
End: 2019-01-16

## 2019-01-16 NOTE — TELEPHONE ENCOUNTER
Discharged Patient Questionnaire:  1. Do you feel that your discharge instructions were clear and understandable? YES And if so can you explain the instructions to me.   2. Can you explain to me how you were an active participant in your care?yes  3. Are you having any unusual symptoms or problems such as NO  4. Have you filled your new prescriptions yet? YES Do you have any questions about those medications? NO  5. Do you have a follow-up appointment with your physician?{YES    If so, when? Pedi 19 maternal 2019  Doesn't recall the date but on discharge papers    REWARD AND RECOGNITION   Are there any physicians, nurses, or hospital staff you would like us to recognize for doing a very good job? Jillian Gupta and Nae Rudolph her two favorites.  Had a wonderful experience.  Communication was great.  Felt like she got really great care.    PROCESS IMPROVEMENT    Do you have any questions for me? Ulices additional blood work on the patient.  Never got results.    Do you have any suggestions for us to improve the care we provide to our patients and the community we serve? No   Thank you for taking the time to share with me about your care.

## 2019-01-18 NOTE — H&P
[facility] -   Obstetrics  History & Physical    Patient Name: Oneyda Obando  MRN: [id number]  Admission Date: 2019  Primary Care Provider: Primary Doctor No    Subjective:     Principal Problem:S/P primary low transverse     History of Present Illness:  Sent from office for IOL secondary to BPP  of 8    No new subjective & objective note has been filed under this hospital service since the last note was generated.    Assessment/Plan:     33 y.o. female  at 41w0d for:    * S/P primary low transverse     POD#2 s/p primary LTCS   Afebrile overnight  Desires discharge          Abnormal pregnancy US    Admitted for induction  Sheldon bulb inserted  Pitocin begun  AROM  IUPC  Proceed to LTCS         Sydnee Hall CNM  Obstetrics  [facility]

## 2019-01-21 ENCOUNTER — CLINICAL SUPPORT (OUTPATIENT)
Dept: OBSTETRICS AND GYNECOLOGY | Facility: CLINIC | Age: 34
End: 2019-01-21
Payer: COMMERCIAL

## 2019-01-21 NOTE — PROGRESS NOTES
Pt here for c section dressing removal. Pt tolerated removal of bandage with minimal discomfort. Pt instructed on taking showers and not submerging her incision. Pt verbalized understanding.

## 2019-01-30 ENCOUNTER — PATIENT MESSAGE (OUTPATIENT)
Dept: OBSTETRICS AND GYNECOLOGY | Facility: CLINIC | Age: 34
End: 2019-01-30

## 2019-01-31 ENCOUNTER — OFFICE VISIT (OUTPATIENT)
Dept: OBSTETRICS AND GYNECOLOGY | Facility: CLINIC | Age: 34
End: 2019-01-31
Payer: COMMERCIAL

## 2019-01-31 VITALS
HEIGHT: 63 IN | SYSTOLIC BLOOD PRESSURE: 116 MMHG | DIASTOLIC BLOOD PRESSURE: 62 MMHG | BODY MASS INDEX: 32.86 KG/M2 | WEIGHT: 185.44 LBS

## 2019-01-31 DIAGNOSIS — Z98.891 S/P PRIMARY LOW TRANSVERSE C-SECTION: Primary | ICD-10-CM

## 2019-01-31 DIAGNOSIS — S30.1XXA HEMATOMA OF ABDOMINAL WALL, INITIAL ENCOUNTER: ICD-10-CM

## 2019-01-31 PROCEDURE — 0503F POSTPARTUM CARE VISIT: CPT | Mod: S$GLB,,, | Performed by: OBSTETRICS & GYNECOLOGY

## 2019-01-31 PROCEDURE — 99999 PR PBB SHADOW E&M-EST. PATIENT-LVL II: CPT | Mod: PBBFAC,,, | Performed by: OBSTETRICS & GYNECOLOGY

## 2019-01-31 PROCEDURE — 99999 PR PBB SHADOW E&M-EST. PATIENT-LVL II: ICD-10-PCS | Mod: PBBFAC,,, | Performed by: OBSTETRICS & GYNECOLOGY

## 2019-01-31 PROCEDURE — 0503F PR POSTPARTUM CARE VISIT: ICD-10-PCS | Mod: S$GLB,,, | Performed by: OBSTETRICS & GYNECOLOGY

## 2019-02-02 NOTE — PROGRESS NOTES
Patient presents today for postoperative follow up.  Pt. underwent  on 19.  Patient is recovering well overall, but has noted increased swelling and tenderness just above her incision over past 2-3 days.  No fever or chills.  Light vaginal bleeding.  No other concerns.  Breastfeeding is going well.    Physical Exam:  General - no acute distress  Abdomen - soft, nontender and nondistended.  No masses.  Incision well healed with no evidence of infection.  Small palpable hemotoma present anterior to incision in midline.  Tenderness present over that area.  No drainage from incision.  Extremities - nontender and no edema noted.    Encounter Diagnoses   Name Primary?    S/P primary low transverse  Yes    Hematoma of abdominal wall, initial encounter        PLAN:  Follow up as scheduled.  Patient advised to use ibuprofen and apply heat to hematoma.

## 2019-02-14 ENCOUNTER — POSTPARTUM VISIT (OUTPATIENT)
Dept: OBSTETRICS AND GYNECOLOGY | Facility: CLINIC | Age: 34
End: 2019-02-14
Payer: COMMERCIAL

## 2019-02-14 VITALS
BODY MASS INDEX: 31.32 KG/M2 | WEIGHT: 176.81 LBS | SYSTOLIC BLOOD PRESSURE: 112 MMHG | DIASTOLIC BLOOD PRESSURE: 72 MMHG

## 2019-02-14 DIAGNOSIS — Z98.891 S/P PRIMARY LOW TRANSVERSE C-SECTION: Primary | ICD-10-CM

## 2019-02-14 DIAGNOSIS — S30.1XXS ABDOMINAL WALL HEMATOMA, SEQUELA: ICD-10-CM

## 2019-02-14 PROBLEM — S30.1XXA HEMATOMA OF ABDOMINAL WALL: Status: ACTIVE | Noted: 2019-02-14

## 2019-02-14 PROBLEM — O99.019 ANEMIA OF MOTHER IN PREGNANCY, ANTEPARTUM: Status: RESOLVED | Noted: 2018-10-16 | Resolved: 2019-02-14

## 2019-02-14 PROBLEM — O09.512 ADVANCED MATERNAL AGE, PRIMIGRAVIDA IN SECOND TRIMESTER, ANTEPARTUM: Status: RESOLVED | Noted: 2018-08-20 | Resolved: 2019-02-14

## 2019-02-14 PROBLEM — O28.3 ABNORMAL PREGNANCY US: Status: RESOLVED | Noted: 2019-01-11 | Resolved: 2019-02-14

## 2019-02-14 PROCEDURE — 99999 PR PBB SHADOW E&M-EST. PATIENT-LVL II: CPT | Mod: PBBFAC,,, | Performed by: OBSTETRICS & GYNECOLOGY

## 2019-02-14 PROCEDURE — 99999 PR PBB SHADOW E&M-EST. PATIENT-LVL II: ICD-10-PCS | Mod: PBBFAC,,, | Performed by: OBSTETRICS & GYNECOLOGY

## 2019-02-14 NOTE — PROGRESS NOTES
CC: Post-partum follow-up    nOeyda Obando is a 33 y.o. female  who presents for post-partum visit.  She is S/P a .  She and the baby are doing well.  No pain.  No fever.   No bowel / bladder complaints.  Still has hematoma on abdominal incision.    Delivery Date: 19  Delivery MD: Mihir  Gender: Male  Birth Weight: 7 pounds 11 ounces  Breast Feeding: Yes  Depression: No  Contraception: Declines, as no conception occurred during the 10 years she has been with her  until this pregnancy.    Pregnancy was complicated by:   for arrest of descent and OP presentation    /72   Wt 80.2 kg (176 lb 12.9 oz)   Breastfeeding? Yes   BMI 31.32 kg/m²     ROS:  GENERAL: No fever, chills, fatigability.  VULVAR: No pain, no lesions and no itching.  VAGINAL: No relaxation, no itching, no discharge, no abnormal bleeding and no lesions.  ABDOMEN: No abdominal pain. Denies nausea. Denies vomiting. No diarrhea.   BREAST: Denies pain. No lumps. No discharge.  URINARY: No incontinence, no nocturia, no frequency and no dysuria.  CARDIOVASCULAR: No chest pain. No shortness of breath. No leg cramps.  NEUROLOGICAL: No headaches. No vision changes.    PHYSICAL EXAM:  ABDOMEN:  Soft, non-tender, non-distended.  Incision well healed.  Hematoma still present, but significantly decreased in size as compared to prior exam.  VULVA:  Normal, no lesions  CERVIX:  Without lesions, polyps or tenderness.  UTERUS:  Normal size, shape, consistency, no mass or tenderness.  ADNEXA:  Normal in size without mass or tenderness    IMP:  Doing well S/P   Instructions / precautions reviewed  Contraceptive counseling  Hematoma      PLAN:  Patient advised to remain off of work for several more weeks, due to heavy lifting as part of her job.  May resume normal activities when ready  Return: as needed

## 2019-03-19 LAB — PATHOLOGIST INTERPRETATION AB/XM: NORMAL

## 2019-05-13 ENCOUNTER — PATIENT MESSAGE (OUTPATIENT)
Dept: OBSTETRICS AND GYNECOLOGY | Facility: CLINIC | Age: 34
End: 2019-05-13

## 2019-05-21 ENCOUNTER — PATIENT MESSAGE (OUTPATIENT)
Dept: OBSTETRICS AND GYNECOLOGY | Facility: CLINIC | Age: 34
End: 2019-05-21

## 2019-05-21 NOTE — TELEPHONE ENCOUNTER
Called patient and when asked if she was having any thoughts of harming herself or her children she denied those thoughts.  Patient encouraged to go to ED if she has any feelings like that.  Patient is breast feeding and not sleeping.  Patient encouraged to get a break from her house and children and to take a walk outside or get some alone time.  Patient encouraged to come to appointment on Thursday.  Patient verbalized understanding.

## 2019-05-23 ENCOUNTER — OFFICE VISIT (OUTPATIENT)
Dept: OBSTETRICS AND GYNECOLOGY | Facility: CLINIC | Age: 34
End: 2019-05-23
Payer: COMMERCIAL

## 2019-05-23 VITALS — BODY MASS INDEX: 28.67 KG/M2 | HEIGHT: 63 IN | WEIGHT: 161.81 LBS

## 2019-05-23 PROCEDURE — 3008F BODY MASS INDEX DOCD: CPT | Mod: CPTII,S$GLB,, | Performed by: OBSTETRICS & GYNECOLOGY

## 2019-05-23 PROCEDURE — 99213 OFFICE O/P EST LOW 20 MIN: CPT | Mod: S$GLB,,, | Performed by: OBSTETRICS & GYNECOLOGY

## 2019-05-23 PROCEDURE — 99213 PR OFFICE/OUTPT VISIT, EST, LEVL III, 20-29 MIN: ICD-10-PCS | Mod: S$GLB,,, | Performed by: OBSTETRICS & GYNECOLOGY

## 2019-05-23 PROCEDURE — 99999 PR PBB SHADOW E&M-EST. PATIENT-LVL II: CPT | Mod: PBBFAC,,, | Performed by: OBSTETRICS & GYNECOLOGY

## 2019-05-23 PROCEDURE — 3008F PR BODY MASS INDEX (BMI) DOCUMENTED: ICD-10-PCS | Mod: CPTII,S$GLB,, | Performed by: OBSTETRICS & GYNECOLOGY

## 2019-05-23 PROCEDURE — 99999 PR PBB SHADOW E&M-EST. PATIENT-LVL II: ICD-10-PCS | Mod: PBBFAC,,, | Performed by: OBSTETRICS & GYNECOLOGY

## 2019-05-23 RX ORDER — SERTRALINE HYDROCHLORIDE 25 MG/1
25 TABLET, FILM COATED ORAL DAILY
Qty: 30 TABLET | Refills: 6 | Status: SHIPPED | OUTPATIENT
Start: 2019-05-23 | End: 2019-11-07 | Stop reason: SDUPTHER

## 2019-05-25 NOTE — PROGRESS NOTES
"Oneyda Obando is a 34 y.o. female  presents today for c/o PP depression.  Baby is 4 months old.  She is breastfeeding.  She reports her baby is waking up every 2 hours at night to eat, and sleeps very little during the day (2 naps, 45 min each).  She has limited support with no family in the area, and her  works all day.  She is exhausted from lack of sleep.  She feels depressed mood, frequent crying episodes, and is finding it hard to function.  No suicidal or homicidal ideation.      History reviewed. No pertinent past medical history.  Past Surgical History:   Procedure Laterality Date     SECTION N/A 2019    Performed by [facility] at [facility]D    GANGLION CYST EXCISION       Family History   Problem Relation Age of Onset    Breast cancer Neg Hx     Ovarian cancer Neg Hx      Social History     Tobacco Use    Smoking status: Never Smoker    Smokeless tobacco: Never Used   Substance Use Topics    Alcohol use: No     Frequency: Never    Drug use: No     OB History    Para Term  AB Living   1 1 1     1   SAB TAB Ectopic Multiple Live Births         0 1      # Outcome Date GA Lbr Rohit/2nd Weight Sex Delivery Anes PTL Lv   1 Term 19 41w0d  3.5 kg (7 lb 11.5 oz) M CS-LTranv EPI N JACLYN      Complications: Failure to Progress in Second Stage      Obstetric Comments   G//Panorama WNL       Ht 5' 3" (1.6 m)   Wt 73.4 kg (161 lb 13.1 oz)   LMP 2019   BMI 28.66 kg/m²     ROS:  GENERAL: No fever, chills, fatigability or weight loss.  VULVAR: No pain, no lesions and no itching.  VAGINAL: No relaxation, no itching, no discharge, no abnormal bleeding and no lesions.  ABDOMEN: No abdominal pain. Denies nausea. Denies vomiting. No diarrhea. No constipation  BREAST: Denies pain. No lumps. No discharge.  URINARY: No incontinence, no nocturia, no frequency and no dysuria.      PE:   General - well developed, well nourished, no acute distress  Skin - " normal turgor  Mental status - alert and oriented x 3, depressed mood, tearful      ASSESSMENT:    1. Postpartum depression             PLAN  Patient counseled regarding PP depression.  Encouraged her to discuss baby's sleep patterns with her pediatrician to see if there is an issue causing him to not sleep longer.  Encouraged her to get sleep whenever she can.  Discussed treatment options.  Will begin Zoloft 25 mg daily.  Patient advised to keep me updated as to how things are going.    Total visit time 15 minutes, with greater than half of that spent face to face counseling patient.

## 2019-11-04 ENCOUNTER — PATIENT OUTREACH (OUTPATIENT)
Dept: ADMINISTRATIVE | Facility: HOSPITAL | Age: 34
End: 2019-11-04

## 2019-11-04 ENCOUNTER — OFFICE VISIT (OUTPATIENT)
Dept: FAMILY MEDICINE | Facility: CLINIC | Age: 34
End: 2019-11-04
Payer: COMMERCIAL

## 2019-11-04 VITALS
WEIGHT: 168 LBS | BODY MASS INDEX: 29.77 KG/M2 | HEIGHT: 63 IN | DIASTOLIC BLOOD PRESSURE: 75 MMHG | HEART RATE: 105 BPM | TEMPERATURE: 99 F | SYSTOLIC BLOOD PRESSURE: 119 MMHG

## 2019-11-04 DIAGNOSIS — R42 VERTIGO: Primary | ICD-10-CM

## 2019-11-04 DIAGNOSIS — R92.30 DENSE BREAST TISSUE: ICD-10-CM

## 2019-11-04 DIAGNOSIS — T14.8XXA BRUISING: ICD-10-CM

## 2019-11-04 DIAGNOSIS — Z00.00 ENCOUNTER FOR ANNUAL HEALTH EXAMINATION: ICD-10-CM

## 2019-11-04 PROBLEM — Z98.891 S/P PRIMARY LOW TRANSVERSE C-SECTION: Status: RESOLVED | Noted: 2019-01-13 | Resolved: 2019-11-04

## 2019-11-04 PROBLEM — S30.1XXA HEMATOMA OF ABDOMINAL WALL: Status: RESOLVED | Noted: 2019-02-14 | Resolved: 2019-11-04

## 2019-11-04 PROCEDURE — 99999 PR PBB SHADOW E&M-EST. PATIENT-LVL III: CPT | Mod: PBBFAC,,, | Performed by: INTERNAL MEDICINE

## 2019-11-04 PROCEDURE — 3008F PR BODY MASS INDEX (BMI) DOCUMENTED: ICD-10-PCS | Mod: CPTII,S$GLB,, | Performed by: INTERNAL MEDICINE

## 2019-11-04 PROCEDURE — 99204 OFFICE O/P NEW MOD 45 MIN: CPT | Mod: S$GLB,,, | Performed by: INTERNAL MEDICINE

## 2019-11-04 PROCEDURE — 3008F BODY MASS INDEX DOCD: CPT | Mod: CPTII,S$GLB,, | Performed by: INTERNAL MEDICINE

## 2019-11-04 PROCEDURE — 99999 PR PBB SHADOW E&M-EST. PATIENT-LVL III: ICD-10-PCS | Mod: PBBFAC,,, | Performed by: INTERNAL MEDICINE

## 2019-11-04 PROCEDURE — 99204 PR OFFICE/OUTPT VISIT, NEW, LEVL IV, 45-59 MIN: ICD-10-PCS | Mod: S$GLB,,, | Performed by: INTERNAL MEDICINE

## 2019-11-04 RX ORDER — IBUPROFEN 200 MG
200 TABLET ORAL EVERY 6 HOURS PRN
COMMUNITY

## 2019-11-04 NOTE — ASSESSMENT & PLAN NOTE
Dense breast tissue on mmg and US ~3 yrs ago. Recommendations at that time for yearly MMG. Order today.

## 2019-11-04 NOTE — PROGRESS NOTES
Assessment/Plan:    Dense breast tissue  Dense breast tissue on mmg and US ~3 yrs ago. Recommendations at that time for yearly MMG. Order today.    Vertigo  Acute, positional. Previously intermittent, now more sustained. Present for 1 week. No associated symptoms. Negative [place]-hallpike on exam. No nystagmus noted at all on exam. Recently discontinued her SSRI (1 week ago). Stopped taking completely. Suspect symptoms 2/2 SRRI withdrawal. Will start on 4 week taper of SSRI. Can take meclizine if needed for current symptoms. Instructed to return to clinic if no improvement of symptoms    Encounter for annual health examination  Complete history and physical was completed today.  Complete and thorough medication reconciliation was performed.  Discussed risks and benefits of medications.  Advised patient on orders and health maintenance.  We discussed old records and old labs if available.  Will request any records not available through epic.  Continue current medications listed on your summary sheet.    _____________________________________________________________________________________________________________________________________________________    Orders this visit:    Vertigo  -     Pregnancy Test Screening, Serum; Future; Expected date: 11/04/2019    Dense breast tissue  -     Mammo Digital Screening Bilat; Future; Expected date: 11/04/2019    Encounter for annual health examination  -     CBC auto differential; Future; Expected date: 11/04/2019  -     Comprehensive metabolic panel; Future; Expected date: 11/04/2019  -     Iron and TIBC; Future; Expected date: 11/04/2019  -     Ferritin; Future; Expected date: 11/04/2019  -     Lipid panel; Future; Expected date: 11/04/2019    Bruising  -     Protime-INR; Future; Expected date: 11/04/2019  -     APTT; Future; Expected date: 11/04/2019      Follow up in about 1 year (around 11/4/2020), or if symptoms worsen or fail to improve.    Michaela Atkins,  MD  _____________________________________________________________________________________________________________________________________________________    HPI:    Patient is in clinic today as an established patient, new to me and this clinic, here to establish care and with a new complaint of vertigo. Described as room spinning. Only occurring in mornings, happening every day for the past 10 days, lasting about an hour. Over the last several day, happening all day long. Worse with head movement. No alleviating factors. Denies tinnitus, ear fullness, or hearing loss. Does reports some mild dysequilibrium but denies falling. Denies any other neuro deficits.     Also discussed health maintenance today.  Patient has a history of dense breast tissue on mammogram 3 years ago and was told to follow up with yearly mammograms.  She is due for this now.  Will order.  Also discussed obtaining routine labs.  Routine vaccinations up-to-date.  Will obtain Pap from gyn.    Past Medical History:  Past Medical History:   Diagnosis Date    Hematoma of abdominal wall 2019    Post partum depression     S/P primary low transverse  2019     Past Surgical History:   Procedure Laterality Date     SECTION N/A 2019    Procedure:  SECTION;  Surgeon: Kiesha Ash MD;  Location: [facility] L[facility];  Service: OB/GYN;  Laterality: N/A;    GANGLION CYST EXCISION       Review of patient's allergies indicates:  No Known Allergies  Social History     Tobacco Use    Smoking status: Never Smoker    Smokeless tobacco: Never Used   Substance Use Topics    Alcohol use: Yes     Alcohol/week: 4.0 - 6.0 standard drinks     Types: 4 - 6 Cans of beer per week     Frequency: 4 or more times a week    Drug use: No     Family History   Problem Relation Age of Onset    Atrial fibrillation Father     Leukemia Brother         CML    Breast cancer Neg Hx     Ovarian cancer Neg Hx      Current Outpatient  "Medications on File Prior to Visit   Medication Sig Dispense Refill    ibuprofen (ADVIL,MOTRIN) 200 MG tablet Take 200 mg by mouth every 6 (six) hours as needed for Pain.      valACYclovir (VALTREX) 1000 MG tablet TK 2 TS PO BID  3    prenatal vit/iron fum/folic ac (PRENATAL 1+1 ORAL) Take by mouth.      sertraline (ZOLOFT) 25 MG tablet Take 1 tablet (25 mg total) by mouth once daily. (Patient not taking: Reported on 11/4/2019) 30 tablet 6     No current facility-administered medications on file prior to visit.        Review of Systems   Constitutional: Negative for chills, diaphoresis, fatigue and fever.   HENT: Negative for congestion, ear pain, postnasal drip, sinus pain and sore throat.    Eyes: Negative for pain and redness.   Respiratory: Negative for cough, chest tightness and shortness of breath.    Cardiovascular: Negative for chest pain and leg swelling.   Gastrointestinal: Negative for abdominal pain, constipation, diarrhea, nausea and vomiting.   Genitourinary: Negative for dysuria and hematuria.   Musculoskeletal: Negative for arthralgias and joint swelling.   Skin: Negative for rash.   Neurological: Positive for dizziness. Negative for syncope, facial asymmetry, speech difficulty, weakness, numbness and headaches.   Hematological: Bruises/bleeds easily.       Vitals:    11/04/19 1124   BP: 119/75   Pulse: 105   Temp: 98.6 °F (37 °C)   Weight: 76.2 kg (168 lb)   Height: 5' 3" (1.6 m)       Wt Readings from Last 3 Encounters:   11/04/19 76.2 kg (168 lb)   05/23/19 73.4 kg (161 lb 13.1 oz)   02/14/19 80.2 kg (176 lb 12.9 oz)       Physical Exam   Constitutional: She is oriented to person, place, and time. She appears well-developed and well-nourished. No distress.   HENT:   Head: Normocephalic and atraumatic.   Eyes: Conjunctivae and EOM are normal.   No nystagmus     Neck: Normal range of motion. Neck supple.   Cardiovascular: Normal rate, regular rhythm, normal heart sounds and intact distal pulses. "   No murmur heard.  Pulmonary/Chest: Effort normal and breath sounds normal. No respiratory distress.   Abdominal: Soft. Bowel sounds are normal. She exhibits no distension. There is no tenderness.   Musculoskeletal: Normal range of motion.   Neurological: She is alert and oriented to person, place, and time. No cranial nerve deficit or sensory deficit. She exhibits normal muscle tone. Coordination normal.   Skin: Skin is warm and dry. No rash noted.   Psychiatric: She has a normal mood and affect.

## 2019-11-04 NOTE — ASSESSMENT & PLAN NOTE
Acute, positional. Previously intermittent, now more sustained. Present for 1 week. No associated symptoms. Negative [place]-hallpike on exam. No nystagmus noted at all on exam. Recently discontinued her SSRI (1 week ago). Stopped taking completely. Suspect symptoms 2/2 SRRI withdrawal. Will start on 4 week taper of SSRI. Can take meclizine if needed for current symptoms. Instructed to return to clinic if no improvement of symptoms

## 2019-11-04 NOTE — PROGRESS NOTES
Pt stated her flu shot given at [facility].  Immunization record updated with flu shot given on 10/21/2019.

## 2019-11-04 NOTE — ASSESSMENT & PLAN NOTE
Complete history and physical was completed today.  Complete and thorough medication reconciliation was performed.  Discussed risks and benefits of medications.  Advised patient on orders and health maintenance.  We discussed old records and old labs if available.  Will request any records not available through epic.  Continue current medications listed on your summary sheet.

## 2019-11-05 ENCOUNTER — PATIENT MESSAGE (OUTPATIENT)
Dept: FAMILY MEDICINE | Facility: CLINIC | Age: 34
End: 2019-11-05

## 2019-11-06 ENCOUNTER — PATIENT MESSAGE (OUTPATIENT)
Dept: FAMILY MEDICINE | Facility: CLINIC | Age: 34
End: 2019-11-06

## 2019-11-06 NOTE — TELEPHONE ENCOUNTER
Spoke with pt, was told that she had to wait 3 mths after breast feeding to get her mammogram. Pt said that she would call back with exact date she stopped, so mammo could be rescheduled.

## 2019-11-07 DIAGNOSIS — F32.A DEPRESSION, UNSPECIFIED DEPRESSION TYPE: Primary | ICD-10-CM

## 2019-11-07 RX ORDER — SERTRALINE HYDROCHLORIDE 25 MG/1
25 TABLET, FILM COATED ORAL DAILY
Qty: 30 TABLET | Refills: 0 | Status: SHIPPED | OUTPATIENT
Start: 2019-11-07 | End: 2019-11-07

## 2019-11-07 RX ORDER — SERTRALINE HYDROCHLORIDE 25 MG/1
25 TABLET, FILM COATED ORAL DAILY
Qty: 30 TABLET | Refills: 0 | Status: SHIPPED | OUTPATIENT
Start: 2019-11-07 | End: 2019-12-07

## 2019-11-07 NOTE — TELEPHONE ENCOUNTER
----- Message from Yessy Collado sent at 11/7/2019  1:40 PM CST -----  Contact: pt  Type:  RX Refill Request    Who Called: Patient  Refill or New Rx:New Rx  RX Name and Strength:Zoloft 25mg  How is the patient currently taking it? (ex. 1XDay):1xday  Is this a 30 day or 90 day RX:30  Preferred Pharmacy with phone number:[facility]/[address]  Local or Mail Order:Local  Ordering Provider:Dr Atkins  Would the patient rather a call back or a response via [facility]? Call back  Best Call Back Number:[phone]  Additional Information: na

## 2019-11-08 RX ORDER — SERTRALINE HYDROCHLORIDE 25 MG/1
TABLET, FILM COATED ORAL
Qty: 30 TABLET | Refills: 6 | Status: SHIPPED | OUTPATIENT
Start: 2019-11-08

## 2020-01-08 ENCOUNTER — TELEPHONE (OUTPATIENT)
Dept: ADMINISTRATIVE | Facility: HOSPITAL | Age: 35
End: 2020-01-08

## 2020-01-08 NOTE — TELEPHONE ENCOUNTER
No answer and pt's VM box is full to leave a reminder message for her scheduled mammogrm appt on Thursday, 1/9/20 in [place]. lw

## 2020-02-03 PROBLEM — Z00.00 ENCOUNTER FOR ANNUAL HEALTH EXAMINATION: Status: RESOLVED | Noted: 2019-11-04 | Resolved: 2020-02-03

## 2020-05-27 ENCOUNTER — TELEPHONE (OUTPATIENT)
Dept: FAMILY MEDICINE | Facility: CLINIC | Age: 35
End: 2020-05-27

## 2020-05-27 NOTE — TELEPHONE ENCOUNTER
----- Message from Mack Urena sent at 5/27/2020  9:10 AM CDT -----  Contact: pt [phone]  Would like to consult with nurse regarding scheduling a mammo.  Please contact Oneyda Obando @ [phone].  Thanks/as

## 2020-05-29 ENCOUNTER — TELEPHONE (OUTPATIENT)
Dept: FAMILY MEDICINE | Facility: CLINIC | Age: 35
End: 2020-05-29

## 2020-05-29 ENCOUNTER — HOSPITAL ENCOUNTER (OUTPATIENT)
Dept: RADIOLOGY | Facility: HOSPITAL | Age: 35
Discharge: HOME | End: 2020-05-29
Attending: INTERNAL MEDICINE
Payer: COMMERCIAL

## 2020-05-29 VITALS — HEIGHT: 63 IN | BODY MASS INDEX: 29.77 KG/M2 | WEIGHT: 168 LBS

## 2020-05-29 DIAGNOSIS — R92.30 DENSE BREAST TISSUE: ICD-10-CM

## 2020-05-29 PROCEDURE — 77063 MAMMO DIGITAL SCREENING BILAT WITH TOMOSYNTHESIS_CAD: ICD-10-PCS | Mod: 26,,, | Performed by: RADIOLOGY

## 2020-05-29 PROCEDURE — 77063 BREAST TOMOSYNTHESIS BI: CPT | Mod: 26,,, | Performed by: RADIOLOGY

## 2020-05-29 PROCEDURE — 77067 MAMMO DIGITAL SCREENING BILAT WITH TOMOSYNTHESIS_CAD: ICD-10-PCS | Mod: 26,,, | Performed by: RADIOLOGY

## 2020-05-29 PROCEDURE — 77067 SCR MAMMO BI INCL CAD: CPT | Mod: TC,PO

## 2020-05-29 PROCEDURE — 77067 SCR MAMMO BI INCL CAD: CPT | Mod: 26,,, | Performed by: RADIOLOGY

## 2020-05-29 NOTE — TELEPHONE ENCOUNTER
----- Message from Ailin Murphy sent at 5/29/2020  2:07 PM CDT -----  Contact: Self  Pt is requesting a paper lab order, that was previously ordered, that she can  and have done at her job ([employer]). The pt can be reached at the contact number listed above. Please advise.

## 2020-06-01 NOTE — PROGRESS NOTES
Normal mammogram, repeat in 1 year, results released through [facility]. Please verify that patient has viewed results. If not, please call patient with interpretation below:    I have reviewed the results of your mammogram and it appears that everything was read as normal.  Based on this, the radiologist has recommended that you recheck a mammogram in 1 year.     Also please see below health maintenance items that are due:    Lipid Panel due on 1983  Pap Smear with HPV Cotest due on 04/06/2004

## 2020-06-04 ENCOUNTER — TELEPHONE (OUTPATIENT)
Dept: FAMILY MEDICINE | Facility: CLINIC | Age: 35
End: 2020-06-04

## 2020-06-04 NOTE — TELEPHONE ENCOUNTER
Patient called back and left message that she would like results sent thru the patient portal as it is difficult for her to answer phone at work. Message copied and sent.

## 2020-06-04 NOTE — TELEPHONE ENCOUNTER
Please inform patient that I have received her lab results.    CBC does show a mild anemia.  It appears that she has had this in the past as well.  Iron studies are still pending.  I will get back to her with the results once I receive these.    Electrolytes, kidney function and liver function all appear with to be within normal limits. PT/INR also within normal limits.

## 2020-06-04 NOTE — TELEPHONE ENCOUNTER
----- Message from Vlad Caraballo sent at 6/4/2020 10:16 AM CDT -----  Contact: self [phone]  Type:  Test Results    Who Called: Oneyda Obando  Name of Test (Lab/Mammo/Etc): lab  Date of Test: 5/29/20  Ordering Provider: Dr Atkins  Where the test was performed: [place]   Would the patient rather a call back or a response via [facility]? Call back   Best Call Back Number: [phone]  Additional Information:  Please e-mail results to [email or web address]. Or update in pt [employer] vic.. Pt states the place where she work it is hard to answer or she may dont get the call do to the building

## 2020-06-04 NOTE — TELEPHONE ENCOUNTER
----- Message from Shelli Elliott sent at 6/4/2020  9:50 AM CDT -----  Contact: pt  Type:  Patient Returning Call    Who Called:pt  Who Left Message for Patient:nurse  Does the patient know what this is regarding?:lab results  Would the patient rather a call back or a response via [facility]? Call back  Best Call Back Number:[phone]  Additional Information: .    Thank you

## 2020-10-06 ENCOUNTER — PATIENT MESSAGE (OUTPATIENT)
Dept: ADMINISTRATIVE | Facility: HOSPITAL | Age: 35
End: 2020-10-06

## 2021-03-25 ENCOUNTER — PATIENT MESSAGE (OUTPATIENT)
Dept: ADMINISTRATIVE | Facility: HOSPITAL | Age: 36
End: 2021-03-25

## 2021-05-12 ENCOUNTER — PATIENT MESSAGE (OUTPATIENT)
Dept: RESEARCH | Facility: HOSPITAL | Age: 36
End: 2021-05-12

## 2022-05-27 ENCOUNTER — PATIENT MESSAGE (OUTPATIENT)
Dept: FAMILY MEDICINE | Facility: CLINIC | Age: 37
End: 2022-05-27
Payer: COMMERCIAL

## 2022-10-27 ENCOUNTER — PATIENT OUTREACH (OUTPATIENT)
Dept: ADMINISTRATIVE | Facility: HOSPITAL | Age: 37
End: 2022-10-27
Payer: COMMERCIAL

## 2022-11-09 ENCOUNTER — PATIENT OUTREACH (OUTPATIENT)
Dept: ADMINISTRATIVE | Facility: HOSPITAL | Age: 37
End: 2022-11-09
Payer: COMMERCIAL

## 2022-11-09 NOTE — PROGRESS NOTES
Attempted to contact patient by phone for PCP visit, was able to speak to patient. Patient has moved to [place].
